# Patient Record
Sex: MALE | Race: WHITE | Employment: FULL TIME | URBAN - METROPOLITAN AREA
[De-identification: names, ages, dates, MRNs, and addresses within clinical notes are randomized per-mention and may not be internally consistent; named-entity substitution may affect disease eponyms.]

---

## 2017-01-22 DIAGNOSIS — I10 ESSENTIAL HYPERTENSION: Primary | ICD-10-CM

## 2017-01-24 RX ORDER — LISINOPRIL 10 MG/1
TABLET ORAL
Qty: 90 TABLET | Refills: 0 | Status: SHIPPED | OUTPATIENT
Start: 2017-01-24 | End: 2017-05-07

## 2017-01-24 NOTE — TELEPHONE ENCOUNTER
LOV 3/28/16. Future Appointments  Date Time Provider Yvette Leonard   1/30/2017 5:00 PM WS 1 [facility]   5/5/2017 3:30 PM Dee Bynum MD [facility]     Refill request for Lisinopril.  Failed HTN protocol because last BMP was

## 2017-02-10 ENCOUNTER — LAB ENCOUNTER (OUTPATIENT)
Dept: LAB | Facility: HOSPITAL | Age: 52
End: 2017-02-10
Attending: INTERNAL MEDICINE
Payer: COMMERCIAL

## 2017-02-10 DIAGNOSIS — E78.5 HYPERLIPIDEMIA, UNSPECIFIED HYPERLIPIDEMIA TYPE: ICD-10-CM

## 2017-02-10 DIAGNOSIS — Z95.4 S/P AORTIC VALVE REPLACEMENT WITH METALLIC VALVE: ICD-10-CM

## 2017-02-10 DIAGNOSIS — Z79.01 LONG TERM (CURRENT) USE OF ANTICOAGULANTS: ICD-10-CM

## 2017-02-10 DIAGNOSIS — I25.10 CORONARY ARTERY DISEASE INVOLVING NATIVE CORONARY ARTERY OF NATIVE HEART WITHOUT ANGINA PECTORIS: ICD-10-CM

## 2017-02-10 DIAGNOSIS — I10 ESSENTIAL HYPERTENSION: ICD-10-CM

## 2017-02-10 LAB
ALBUMIN SERPL-MCNC: 4 G/DL (ref 3.5–4.8)
ALP LIVER SERPL-CCNC: 54 U/L (ref 45–117)
ALT SERPL-CCNC: 75 U/L (ref 17–63)
AST SERPL-CCNC: 32 U/L (ref 15–41)
BASOPHILS # BLD AUTO: 0.06 X10(3) UL (ref 0–0.1)
BASOPHILS NFR BLD AUTO: 0.5 %
BILIRUB SERPL-MCNC: 0.6 MG/DL (ref 0.1–2)
BUN BLD-MCNC: 25 MG/DL (ref 8–20)
CALCIUM BLD-MCNC: 8.6 MG/DL (ref 8.3–10.3)
CHLORIDE: 101 MMOL/L (ref 101–111)
CHOLEST SMN-MCNC: 191 MG/DL (ref ?–200)
CO2: 28 MMOL/L (ref 22–32)
CREAT BLD-MCNC: 1.23 MG/DL (ref 0.7–1.3)
EOSINOPHIL # BLD AUTO: 0.12 X10(3) UL (ref 0–0.3)
EOSINOPHIL NFR BLD AUTO: 1.1 %
ERYTHROCYTE [DISTWIDTH] IN BLOOD BY AUTOMATED COUNT: 14.4 % (ref 11.5–16)
GLUCOSE BLD-MCNC: 103 MG/DL (ref 70–99)
HCT VFR BLD AUTO: 46.2 % (ref 37–53)
HDLC SERPL-MCNC: 58 MG/DL (ref 45–?)
HDLC SERPL: 3.29 {RATIO} (ref ?–4.97)
HGB BLD-MCNC: 15 G/DL (ref 13–17)
IMMATURE GRANULOCYTE COUNT: 0.05 X10(3) UL (ref 0–1)
IMMATURE GRANULOCYTE RATIO %: 0.4 %
LDLC SERPL CALC-MCNC: 110 MG/DL (ref ?–130)
LYMPHOCYTES # BLD AUTO: 1.4 X10(3) UL (ref 0.9–4)
LYMPHOCYTES NFR BLD AUTO: 12.3 %
M PROTEIN MFR SERPL ELPH: 7.7 G/DL (ref 6.1–8.3)
MCH RBC QN AUTO: 27 PG (ref 27–33.2)
MCHC RBC AUTO-ENTMCNC: 32.5 G/DL (ref 31–37)
MCV RBC AUTO: 83.2 FL (ref 80–99)
MONOCYTES # BLD AUTO: 0.94 X10(3) UL (ref 0.1–0.6)
MONOCYTES NFR BLD AUTO: 8.3 %
NEUTROPHIL ABS PRELIM: 8.82 X10 (3) UL (ref 1.3–6.7)
NEUTROPHILS # BLD AUTO: 8.82 X10(3) UL (ref 1.3–6.7)
NEUTROPHILS NFR BLD AUTO: 77.4 %
NONHDLC SERPL-MCNC: 133 MG/DL (ref ?–130)
PLATELET # BLD AUTO: 155 10(3)UL (ref 150–450)
POTASSIUM SERPL-SCNC: 4.4 MMOL/L (ref 3.6–5.1)
RBC # BLD AUTO: 5.55 X10(6)UL (ref 4.3–5.7)
RED CELL DISTRIBUTION WIDTH-SD: 43.5 FL (ref 35.1–46.3)
SODIUM SERPL-SCNC: 136 MMOL/L (ref 136–144)
TRIGLYCERIDES: 114 MG/DL (ref ?–150)
VLDL: 23 MG/DL (ref 5–40)
WBC # BLD AUTO: 11.4 X10(3) UL (ref 4–13)

## 2017-02-10 PROCEDURE — 36415 COLL VENOUS BLD VENIPUNCTURE: CPT

## 2017-02-10 PROCEDURE — 80061 LIPID PANEL: CPT

## 2017-02-10 PROCEDURE — 80053 COMPREHEN METABOLIC PANEL: CPT

## 2017-02-10 PROCEDURE — 85025 COMPLETE CBC W/AUTO DIFF WBC: CPT

## 2017-02-10 NOTE — PROGRESS NOTES
Quick Note:    Please contact the patient tell him that his cholesterol numbers are a little high and he should continue simvastatin but also watch his diet should be low fat. Robin Dumont M.D.   [place] [address] [facility]   2/10/2

## 2017-03-03 ENCOUNTER — OFFICE VISIT (OUTPATIENT)
Dept: FAMILY MEDICINE CLINIC | Facility: CLINIC | Age: 52
End: 2017-03-03

## 2017-03-03 VITALS
BODY MASS INDEX: 29.02 KG/M2 | HEIGHT: 64 IN | DIASTOLIC BLOOD PRESSURE: 80 MMHG | RESPIRATION RATE: 14 BRPM | SYSTOLIC BLOOD PRESSURE: 134 MMHG | HEART RATE: 70 BPM | WEIGHT: 170 LBS | TEMPERATURE: 98 F

## 2017-03-03 DIAGNOSIS — K14.8 TONGUE BITING: Primary | ICD-10-CM

## 2017-03-03 PROCEDURE — 99213 OFFICE O/P EST LOW 20 MIN: CPT | Performed by: FAMILY MEDICINE

## 2017-03-03 NOTE — PROGRESS NOTES
Patient presents with:  Bite: Pt bite tongue last week. Pt would like tongue checked. HPI:   Piero Alex is a 46year old male with PMH CVA, CAD,who presents to the office for tongue injury. Occurred last week while sleeping.   Daughter in law r exam  ASSESSMENT AND PLAN:     Mikie Mei was seen in the office today:  had concerns including Bite. 1. Tongue biting  Unclear cause  Pattern of tongue biting. Possible seizures?   Wife reports shaking movements, but no confusion  Will refer to

## 2017-03-03 NOTE — PATIENT INSTRUCTIONS
[facility] [facility] [address]:  [address] [facility]  [phone]

## 2017-03-29 ENCOUNTER — OFFICE VISIT (OUTPATIENT)
Dept: NEUROLOGY | Facility: CLINIC | Age: 52
End: 2017-03-29

## 2017-03-29 VITALS
BODY MASS INDEX: 30 KG/M2 | RESPIRATION RATE: 18 BRPM | SYSTOLIC BLOOD PRESSURE: 128 MMHG | WEIGHT: 175 LBS | HEART RATE: 72 BPM | DIASTOLIC BLOOD PRESSURE: 64 MMHG

## 2017-03-29 DIAGNOSIS — Z86.73 HISTORY OF STROKE: ICD-10-CM

## 2017-03-29 DIAGNOSIS — K14.8 TONGUE BITING: Primary | ICD-10-CM

## 2017-03-29 PROCEDURE — 99205 OFFICE O/P NEW HI 60 MIN: CPT | Performed by: OTHER

## 2017-03-29 NOTE — PROGRESS NOTES
Neurology H&P    Harry Buck Patient Status:  No patient class for patient encounter    1965 MRN [id number]   Location [address] [place] [address], [address] [place] [address] Drive, [address] [facility] Attending No att. providers found   Hosp Day #  PCP Radha Cantu MD Rfl: 0   Warfarin Sodium 5 MG Oral Tab Take 1-2 tablets daily as directed by Anticoagulation Clinic. Disp: 135 tablet Rfl: 3   Aspirin (ECOTRIN LOW STRENGTH) 81 MG Oral Tab EC Take 81 mg by mouth daily.  Disp:  Rfl:        Problem List:  Patient Active Prob Onset   • Liver cancer [Other] [OTHER] Father    • CAD [Other] [OTHER] Father    • High Blood Pressure Mother    • Stroke Mother      multiple        ROS:  Gen: no unexplained weight loss  Vision: no vision changes, no new blurry or double vision  Head and stroke. He had an episode of recent tongue biting in his sleep. His exam today is on focal. His symptoms clinically sound likely they could be seizures though he states that he can be aroused from his shaking and has never had any AMS.  I will order a 24 ho

## 2017-03-29 NOTE — PATIENT INSTRUCTIONS
Refill policies:    • Allow 2 business days for refills; controlled substances may take longer.   • Contact your pharmacy at least 5 days prior to running out of medication and have them send an electronic request or submit request through the “request re insurance carrier to obtain pre-certification or prior authorization. Unfortunately, [facility] has seen an increase in denial of payment even though the procedure/test has been pre-certified.   You are strongly encouraged to contact your insurance carrier to v

## 2017-03-30 ENCOUNTER — TELEPHONE (OUTPATIENT)
Dept: NEUROLOGY | Facility: CLINIC | Age: 52
End: 2017-03-30

## 2017-03-30 NOTE — TELEPHONE ENCOUNTER
Spoke to pt, MRI Brain ordered by Dr. Kina Medina has been approved by your insurance, authorization # [id number] and is approved through 3.30.17-5.14.17. Please have procedure completed before 5.14.17.  Schedule at your convenience with the central scheduling

## 2017-04-29 ENCOUNTER — HOSPITAL ENCOUNTER (OUTPATIENT)
Dept: MRI IMAGING | Facility: HOSPITAL | Age: 52
Discharge: HOME | End: 2017-04-29
Attending: Other
Payer: COMMERCIAL

## 2017-04-29 DIAGNOSIS — K14.8 TONGUE BITING: ICD-10-CM

## 2017-04-29 DIAGNOSIS — Z86.73 HISTORY OF STROKE: ICD-10-CM

## 2017-04-29 PROCEDURE — A9575 INJ GADOTERATE MEGLUMI 0.1ML: HCPCS | Performed by: OTHER

## 2017-04-29 PROCEDURE — 70553 MRI BRAIN STEM W/O & W/DYE: CPT

## 2017-05-07 DIAGNOSIS — I10 ESSENTIAL HYPERTENSION: Primary | ICD-10-CM

## 2017-05-08 RX ORDER — LISINOPRIL 10 MG/1
TABLET ORAL
Qty: 90 TABLET | Refills: 0 | Status: SHIPPED | OUTPATIENT
Start: 2017-05-08 | End: 2017-05-19

## 2017-05-16 ENCOUNTER — NURSE ONLY (OUTPATIENT)
Dept: NEUROLOGY | Facility: CLINIC | Age: 52
End: 2017-05-16

## 2017-05-16 DIAGNOSIS — K14.8 TONGUE BITING: Primary | ICD-10-CM

## 2017-05-16 PROCEDURE — 95950 AMBULATORY EEG MONITORING: CPT | Performed by: OTHER

## 2017-05-17 ENCOUNTER — TELEPHONE (OUTPATIENT)
Dept: NEUROLOGY | Facility: CLINIC | Age: 52
End: 2017-05-17

## 2017-05-19 ENCOUNTER — OFFICE VISIT (OUTPATIENT)
Dept: NEUROLOGY | Facility: CLINIC | Age: 52
End: 2017-05-19

## 2017-05-19 VITALS
WEIGHT: 172 LBS | RESPIRATION RATE: 16 BRPM | SYSTOLIC BLOOD PRESSURE: 124 MMHG | BODY MASS INDEX: 30 KG/M2 | DIASTOLIC BLOOD PRESSURE: 72 MMHG | HEART RATE: 60 BPM

## 2017-05-19 DIAGNOSIS — K14.8 TONGUE BITING: Primary | ICD-10-CM

## 2017-05-19 PROCEDURE — 99214 OFFICE O/P EST MOD 30 MIN: CPT | Performed by: OTHER

## 2017-05-19 RX ORDER — LEVETIRACETAM 500 MG/1
500 TABLET ORAL 2 TIMES DAILY
Qty: 60 TABLET | Refills: 6 | Status: SHIPPED | OUTPATIENT
Start: 2017-05-19 | End: 2017-08-07

## 2017-05-19 NOTE — PROCEDURES
[facility] - ELECTROENCEPHALOGRAM (EEG) REPORT  Patient Name:  Jessica Williamson   MRN / CSN:  [id number] / [id number]   Date of Birth / Age:  8/2/1965 /  46year old   Encounter Date:  5/16/17         METHODS:  Twenty-two electrodes were applied according to the HV:  performed. IPS: Not performed.     IMPRESSION:  This EEG is an abnormal study recorded in the awake and asleep states showing intermittent sharp waves over the R frontal areas (F8, Fp1, F4) and intermixed sharply contoured

## 2017-05-19 NOTE — PROGRESS NOTES
Neurology H&P    Bryson Andrews Ajithjeromy Patient Status:  No patient class for patient encounter    1965 MRN [id number]   Location [address] [place] [address], [address] [place] [address], [address] [place] [address] Attending No att. providers found   Hosp Day #  PCP Darryl Lee MD that he feels well today. Current Medications:    Current Outpatient Prescriptions:  Atorvastatin Calcium 20 MG Oral Tab Take 1 tablet (20 mg total) by mouth daily.  Disp: 90 tablet Rfl: 3   Metoprolol Succinate  MG Oral Tablet 24 Hr Take 1 tablet BYPASS; W/ STENT< TISSUE VALVE  2011    Comment [facility]       SocHx:    Smoking Status: Former Smoker                   Packs/Day: 0.00  Years:         Smokeless Status: Never Used                        Comment: quit 2000    Alcoh and LEs     SENSORY EXAMINATION:  UE: intact to light touch, pinprick  LE: intact to light touch, pinprick    COORDINATION:  No dysmetria, mild intention tremors L>R; normal ENRIQUE    REFLEXES: 2+ at biceps, 2+ triceps, 2+ at patella, 2+ at the ankles    GAIT

## 2017-08-06 DIAGNOSIS — I10 ESSENTIAL HYPERTENSION: ICD-10-CM

## 2017-08-07 DIAGNOSIS — K14.8 TONGUE BITING: ICD-10-CM

## 2017-08-07 RX ORDER — LEVETIRACETAM 500 MG/1
500 TABLET ORAL 2 TIMES DAILY
Qty: 180 TABLET | Refills: 0 | Status: SHIPPED | OUTPATIENT
Start: 2017-08-07 | End: 2017-08-22

## 2017-08-07 NOTE — TELEPHONE ENCOUNTER
Request for 90 days supply.         Medication: Keppra    Date of last refill: 05/19/17  [place]    Last office visit: 05/19/17  Due back to clinic per last office note:  3 months  Date next office visit scheduled:  0

## 2017-08-08 RX ORDER — LISINOPRIL 10 MG/1
TABLET ORAL
Qty: 90 TABLET | Refills: 0 | Status: SHIPPED | OUTPATIENT
Start: 2017-08-08 | End: 2017-08-22

## 2017-08-22 ENCOUNTER — OFFICE VISIT (OUTPATIENT)
Dept: NEUROLOGY | Facility: CLINIC | Age: 52
End: 2017-08-22

## 2017-08-22 VITALS
SYSTOLIC BLOOD PRESSURE: 100 MMHG | DIASTOLIC BLOOD PRESSURE: 60 MMHG | HEART RATE: 64 BPM | WEIGHT: 161 LBS | BODY MASS INDEX: 28 KG/M2

## 2017-08-22 DIAGNOSIS — Z86.73 HISTORY OF STROKE: Primary | ICD-10-CM

## 2017-08-22 DIAGNOSIS — K14.8 TONGUE BITING: ICD-10-CM

## 2017-08-22 DIAGNOSIS — R56.9 SEIZURE (HCC): ICD-10-CM

## 2017-08-22 PROCEDURE — 99213 OFFICE O/P EST LOW 20 MIN: CPT | Performed by: OTHER

## 2017-08-22 RX ORDER — LEVETIRACETAM 500 MG/1
500 TABLET ORAL 2 TIMES DAILY
Qty: 180 TABLET | Refills: 2 | Status: SHIPPED | OUTPATIENT
Start: 2017-08-22 | End: 2018-02-15

## 2017-08-22 NOTE — PATIENT INSTRUCTIONS
Refill policies:    • Allow 2-3 business days for refills; controlled substances may take longer.   • Contact your pharmacy at least 5 days prior to running out of medication and have them send an electronic request or submit request through the [facility] have a procedure or additional testing performed. [facility]) will contact your insurance carrier to obtain pre-certification or prior authorization.     Unfortunately, [facility] has seen an increase in denial of payment even though the p

## 2017-08-22 NOTE — PROGRESS NOTES
Neurology H&P    Emily Margaret Buck Patient Status:  No patient class for patient encounter    1965 MRN [id number]   Location [address] [place] [address], [address] [place] [address], [address] [place] [address] Attending No att. providers found   Hosp Day #  PCP Thomas De Dios MD Oral Tab TAKE 1 TABLET BY MOUTH DAILY. Disp: 90 tablet Rfl: 0   levETIRAcetam 500 MG Oral Tab Take 1 tablet (500 mg total) by mouth 2 (two) times daily.  Disp: 180 tablet Rfl: 0   Warfarin Sodium 5 MG Oral Tab TAKE 1 & 1/2 TABLETS (7.5MG) MONDAY/WEDNESDAY/F korina, [facility]  11/13/12: OTHER SURGICAL HISTORY      Comment: laproscopic rt shoulder repair   2011: REPLACEMENT PROSTHETIC AORTIC VALVE W/ CARDIOP*      Comment: [facility]  No date: TONSILLECTOMY      Comment: age muscles; palate elevation intact, no dysarthria, no tongue fasciculations or atrophy, strong shoulder shrug.     MOTOR EXAMINATION: normal tone, no fasciculations, normal strength throughout in UEs and LEs     SENSORY EXAMINATION:  UE: intact to light touch

## 2017-11-07 DIAGNOSIS — I10 ESSENTIAL HYPERTENSION: ICD-10-CM

## 2017-11-08 RX ORDER — LISINOPRIL 10 MG/1
TABLET ORAL
Qty: 90 TABLET | Refills: 0 | Status: SHIPPED | OUTPATIENT
Start: 2017-11-08 | End: 2018-02-04

## 2018-02-04 DIAGNOSIS — I10 ESSENTIAL HYPERTENSION: ICD-10-CM

## 2018-02-05 RX ORDER — LISINOPRIL 10 MG/1
TABLET ORAL
Qty: 90 TABLET | Refills: 0 | Status: SHIPPED | OUTPATIENT
Start: 2018-02-05 | End: 2018-05-06

## 2018-02-05 NOTE — TELEPHONE ENCOUNTER
Refill request was received by Pharmacy. One refill given. Pt needs Appt with Dr Altaf Lopez. LOV 03/03/2017. Please call Pt and assist with scheduling. Routed to [facility].

## 2018-02-07 NOTE — TELEPHONE ENCOUNTER
Called patient and let him know that prescription was refilled but needs an appointment in order to get future refills, patient stated he will check his schedule and contact the office to schedule

## 2018-02-12 ENCOUNTER — OFFICE VISIT (OUTPATIENT)
Dept: FAMILY MEDICINE CLINIC | Facility: CLINIC | Age: 53
End: 2018-02-12

## 2018-02-12 VITALS
TEMPERATURE: 98 F | HEIGHT: 63.4 IN | WEIGHT: 153.38 LBS | SYSTOLIC BLOOD PRESSURE: 90 MMHG | BODY MASS INDEX: 26.84 KG/M2 | DIASTOLIC BLOOD PRESSURE: 60 MMHG | HEART RATE: 80 BPM | RESPIRATION RATE: 14 BRPM

## 2018-02-12 DIAGNOSIS — R56.9 SEIZURE (HCC): ICD-10-CM

## 2018-02-12 DIAGNOSIS — I10 ESSENTIAL HYPERTENSION, BENIGN: ICD-10-CM

## 2018-02-12 DIAGNOSIS — Z79.01 LONG TERM (CURRENT) USE OF ANTICOAGULANTS: ICD-10-CM

## 2018-02-12 DIAGNOSIS — E78.5 HYPERLIPIDEMIA, UNSPECIFIED HYPERLIPIDEMIA TYPE: ICD-10-CM

## 2018-02-12 DIAGNOSIS — Z00.00 ANNUAL PHYSICAL EXAM: Primary | ICD-10-CM

## 2018-02-12 DIAGNOSIS — Z12.11 COLON CANCER SCREENING: ICD-10-CM

## 2018-02-12 DIAGNOSIS — I25.10 CORONARY ARTERY DISEASE INVOLVING NATIVE CORONARY ARTERY OF NATIVE HEART WITHOUT ANGINA PECTORIS: ICD-10-CM

## 2018-02-12 DIAGNOSIS — Z95.4 S/P AORTIC VALVE REPLACEMENT WITH METALLIC VALVE: ICD-10-CM

## 2018-02-12 PROBLEM — K14.8 TONGUE BITING: Status: RESOLVED | Noted: 2017-03-29 | Resolved: 2018-02-12

## 2018-02-12 PROCEDURE — 99396 PREV VISIT EST AGE 40-64: CPT | Performed by: FAMILY MEDICINE

## 2018-02-12 NOTE — PROGRESS NOTES
Patient presents with:  Physical     HPI:   Rubén Hawkins is a 45year old male with h/o CAD and CVA, seizure who presents for a complete physical exam. Feeling well today. Last colonoscopy:  Never in the past.    Last PSA:  No family history.    Imm tablet Rfl: 3   Atorvastatin Calcium 20 MG Oral Tab Take 1 tablet (20 mg total) by mouth daily. Disp: 90 tablet Rfl: 3   Aspirin (ECOTRIN LOW STRENGTH) 81 MG Oral Tab EC Take 81 mg by mouth daily.  Disp:  Rfl:       Past Medical History:   Diagnosis Date 153 lb 6.4 oz   BMI 26.83 kg/m²   Body mass index is 26.83 kg/m². General appearance: alert, appears stated age and cooperative  Eyes: conjunctivae/corneas clear. PERRL, EOM's intact.    Ears: normal TM's and external ear canals both ears  Nose: Nares n

## 2018-02-15 ENCOUNTER — OFFICE VISIT (OUTPATIENT)
Dept: NEUROLOGY | Facility: CLINIC | Age: 53
End: 2018-02-15

## 2018-02-15 ENCOUNTER — LAB ENCOUNTER (OUTPATIENT)
Dept: LAB | Facility: HOSPITAL | Age: 53
End: 2018-02-15
Attending: FAMILY MEDICINE
Payer: COMMERCIAL

## 2018-02-15 VITALS
DIASTOLIC BLOOD PRESSURE: 52 MMHG | HEART RATE: 64 BPM | WEIGHT: 155 LBS | RESPIRATION RATE: 16 BRPM | SYSTOLIC BLOOD PRESSURE: 100 MMHG | BODY MASS INDEX: 27 KG/M2

## 2018-02-15 DIAGNOSIS — I63.9 CEREBRAL INFARCTION, UNSPECIFIED MECHANISM (HCC): ICD-10-CM

## 2018-02-15 DIAGNOSIS — E78.5 HYPERLIPIDEMIA: ICD-10-CM

## 2018-02-15 DIAGNOSIS — I25.10 CAD (CORONARY ARTERY DISEASE), NATIVE CORONARY ARTERY: ICD-10-CM

## 2018-02-15 DIAGNOSIS — Z12.11 COLON CANCER SCREENING: ICD-10-CM

## 2018-02-15 DIAGNOSIS — I45.2 RBBB (RIGHT BUNDLE BRANCH BLOCK WITH LEFT ANTERIOR FASCICULAR BLOCK): ICD-10-CM

## 2018-02-15 DIAGNOSIS — Z95.4 S/P AORTIC VALVE REPLACEMENT WITH METALLIC VALVE: ICD-10-CM

## 2018-02-15 DIAGNOSIS — R56.9 SEIZURE (HCC): Primary | ICD-10-CM

## 2018-02-15 DIAGNOSIS — I10 HYPERTENSION: ICD-10-CM

## 2018-02-15 LAB
ALBUMIN SERPL-MCNC: 4 G/DL (ref 3.5–4.8)
ALP LIVER SERPL-CCNC: 44 U/L (ref 45–117)
ALT SERPL-CCNC: 38 U/L (ref 17–63)
AST SERPL-CCNC: 23 U/L (ref 15–41)
BILIRUB DIRECT SERPL-MCNC: 0.1 MG/DL (ref 0.1–0.5)
BILIRUB SERPL-MCNC: 0.6 MG/DL (ref 0.1–2)
CHOLEST SMN-MCNC: 138 MG/DL (ref ?–200)
HDLC SERPL-MCNC: 59 MG/DL (ref 45–?)
HDLC SERPL: 2.34 {RATIO} (ref ?–4.97)
LDLC SERPL CALC-MCNC: 68 MG/DL (ref ?–130)
M PROTEIN MFR SERPL ELPH: 7.2 G/DL (ref 6.1–8.3)
NONHDLC SERPL-MCNC: 79 MG/DL (ref ?–130)
TRIGL SERPL-MCNC: 54 MG/DL (ref ?–150)
VLDLC SERPL CALC-MCNC: 11 MG/DL (ref 5–40)

## 2018-02-15 PROCEDURE — 36415 COLL VENOUS BLD VENIPUNCTURE: CPT

## 2018-02-15 PROCEDURE — 99213 OFFICE O/P EST LOW 20 MIN: CPT | Performed by: OTHER

## 2018-02-15 PROCEDURE — 80076 HEPATIC FUNCTION PANEL: CPT

## 2018-02-15 PROCEDURE — 80061 LIPID PANEL: CPT

## 2018-02-15 RX ORDER — LEVETIRACETAM 500 MG/1
500 TABLET ORAL 2 TIMES DAILY
Qty: 180 TABLET | Refills: 3 | Status: SHIPPED | OUTPATIENT
Start: 2018-02-15 | End: 2019-04-30

## 2018-02-15 NOTE — PROGRESS NOTES
Neurology H&P    Robert Arjun Cansecojeromy Patient Status:  No patient class for patient encounter    1965 MRN [id number]   Location [address] [place] [address], [address] [place] [address], [address] [place] [address] Attending No att. providers found   Hosp Day #  PCP Jose Lee MD TAKE 1 TABLET BY MOUTH DAILY. Disp: 90 tablet Rfl: 0   Metoprolol Succinate  MG Oral Tablet 24 Hr Take 1 tablet (100 mg total) by mouth once daily.  Disp: 90 tablet Rfl: 3   levETIRAcetam 500 MG Oral Tab Take 1 tablet (500 mg total) by mouth 2 (two) t shoulder repair   2011: REPLACEMENT PROSTHETIC AORTIC VALVE W/ CARDIOP*      Comment: [facility]  No date: TONSILLECTOMY      Comment: age 9    SocHx:  Smoking status: Former Smoker fasciculations or atrophy, strong shoulder shrug.     MOTOR EXAMINATION: normal tone, no fasciculations, normal strength throughout in UEs and LEs     SENSORY EXAMINATION:  UE: intact to light touch, pinprick  LE: intact to light touch, pinprick    [place]

## 2018-02-15 NOTE — PATIENT INSTRUCTIONS
Refill policies:    • Allow 2-3 business days for refills; controlled substances may take longer.   • Contact your pharmacy at least 5 days prior to running out of medication and have them send an electronic request or submit request through the [facility] recommended that you have a procedure or additional testing performed. [facility]) will contact your insurance carrier to obtain pre-certification or prior authorization.     Unfortunately, [facility] has seen an increase in denial of paym

## 2018-05-06 DIAGNOSIS — I10 ESSENTIAL HYPERTENSION: ICD-10-CM

## 2018-05-07 RX ORDER — LISINOPRIL 10 MG/1
TABLET ORAL
Qty: 90 TABLET | Refills: 0 | Status: SHIPPED | OUTPATIENT
Start: 2018-05-07 | End: 2018-08-04

## 2018-08-04 DIAGNOSIS — I10 ESSENTIAL HYPERTENSION: ICD-10-CM

## 2018-08-06 RX ORDER — LISINOPRIL 10 MG/1
TABLET ORAL
Qty: 90 TABLET | Refills: 2 | Status: SHIPPED | OUTPATIENT
Start: 2018-08-06 | End: 2019-05-01

## 2018-08-06 NOTE — TELEPHONE ENCOUNTER
Refill request from [facility] for Lisinopril 10mg. LOV 2/12/18 with [address] for annual physical.  This request did not pass protocol because of the date of his last CMP 2/10/17. A CMP and Lipids were ordered by Nick PÉREZ for patient to have these labs drawn. He has no upcoming appointments at this office. Will you refill Lisinopril?   Route to [address]

## 2019-02-27 ENCOUNTER — TELEPHONE (OUTPATIENT)
Dept: FAMILY MEDICINE CLINIC | Facility: CLINIC | Age: 54
End: 2019-02-27

## 2019-02-27 NOTE — TELEPHONE ENCOUNTER
LOV 2/12/2018 with Dr Erinn Bills for physical no mention of Scoliosis in problem list called patient called patient and LMOM to call back about this needs to be seen so MD can establish that this is a problem

## 2019-02-27 NOTE — TELEPHONE ENCOUNTER
Patient was seen this morning at a clinic that he was sent to to get a physical for his new job. The doctor at that clinic diagnosed him with Scoliosis.  Patient was told by his employer to get a note from his PCP stating that this diagnosis would not be a

## 2019-02-28 ENCOUNTER — OFFICE VISIT (OUTPATIENT)
Dept: FAMILY MEDICINE CLINIC | Facility: CLINIC | Age: 54
End: 2019-02-28
Payer: COMMERCIAL

## 2019-02-28 VITALS
HEIGHT: 62 IN | RESPIRATION RATE: 16 BRPM | BODY MASS INDEX: 28.71 KG/M2 | OXYGEN SATURATION: 97 % | SYSTOLIC BLOOD PRESSURE: 110 MMHG | HEART RATE: 56 BPM | TEMPERATURE: 98 F | WEIGHT: 156 LBS | DIASTOLIC BLOOD PRESSURE: 66 MMHG

## 2019-02-28 DIAGNOSIS — Z76.89 RETURN TO WORK EXAM: Primary | ICD-10-CM

## 2019-02-28 PROCEDURE — 99213 OFFICE O/P EST LOW 20 MIN: CPT | Performed by: PHYSICIAN ASSISTANT

## 2019-03-01 NOTE — PROGRESS NOTES
CC: Scoliosis evaluation? HISTORY OF PRESENT ILLNESS  Caity Etienne is a 52year old male who presents for evaluation of possible scoliosis. 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  He was required to have a work physica history      aortic valve replacement #27 [place] [employer], [facility]   • Other surgical history  11/13/12    laproscopic rt shoulder repair    • Replacement prosthetic aortic valve w/ cardiopulmonary bypass; w/ stent< tissue valve  2011    Lo

## 2019-04-30 DIAGNOSIS — R56.9 SEIZURE (HCC): ICD-10-CM

## 2019-05-01 DIAGNOSIS — I10 ESSENTIAL HYPERTENSION: ICD-10-CM

## 2019-05-01 RX ORDER — LEVETIRACETAM 500 MG/1
500 TABLET ORAL 2 TIMES DAILY
Qty: 60 TABLET | Refills: 0 | Status: SHIPPED | OUTPATIENT
Start: 2019-05-01 | End: 2019-05-31

## 2019-05-01 NOTE — TELEPHONE ENCOUNTER
Sent the patient a [email or web address] message stating that the patient would need to make an appointment for further medications.      Medication: LEVETIRACETAM 500 MG Oral Tab    Date of last refill: 02/15/18 (#180/3)  [place]

## 2019-05-02 ENCOUNTER — TELEPHONE (OUTPATIENT)
Dept: FAMILY MEDICINE CLINIC | Facility: CLINIC | Age: 54
End: 2019-05-02

## 2019-05-02 DIAGNOSIS — Z13.0 SCREENING FOR BLOOD DISEASE: Primary | ICD-10-CM

## 2019-05-02 DIAGNOSIS — Z13.228 SCREENING FOR METABOLIC DISORDER: ICD-10-CM

## 2019-05-02 DIAGNOSIS — Z13.220 SCREENING FOR LIPID DISORDERS: ICD-10-CM

## 2019-05-02 DIAGNOSIS — Z13.29 SCREENING FOR THYROID DISORDER: ICD-10-CM

## 2019-05-02 DIAGNOSIS — Z12.5 SCREENING FOR PROSTATE CANCER: ICD-10-CM

## 2019-05-02 RX ORDER — LISINOPRIL 10 MG/1
TABLET ORAL
Qty: 90 TABLET | Refills: 0 | Status: SHIPPED | OUTPATIENT
Start: 2019-05-02 | End: 2019-07-25

## 2019-05-02 NOTE — TELEPHONE ENCOUNTER
Please enter lab orders for the patient's upcoming physical appointment. Physical scheduled:    Your appointments     Date & Time Appointment Department [facility])    May 14, 2019  5:30 PM CDT Physical - Established Patient with Shamika Collier

## 2019-05-02 NOTE — TELEPHONE ENCOUNTER
Please enter lab orders for the patient's upcoming physical appointment. Physical scheduled:    Your appointments     Date & Time Appointment Department [facility])    May 13, 2019  6:00 PM CDT Physical - Established Patient with RENATA Dave

## 2019-05-13 ENCOUNTER — LAB ENCOUNTER (OUTPATIENT)
Dept: LAB | Facility: HOSPITAL | Age: 54
End: 2019-05-13
Attending: NURSE PRACTITIONER
Payer: COMMERCIAL

## 2019-05-13 DIAGNOSIS — Z12.5 SCREENING FOR PROSTATE CANCER: ICD-10-CM

## 2019-05-13 DIAGNOSIS — Z13.0 SCREENING FOR BLOOD DISEASE: ICD-10-CM

## 2019-05-13 DIAGNOSIS — Z13.228 SCREENING FOR METABOLIC DISORDER: ICD-10-CM

## 2019-05-13 DIAGNOSIS — Z13.29 SCREENING FOR THYROID DISORDER: ICD-10-CM

## 2019-05-13 DIAGNOSIS — Z13.220 SCREENING FOR LIPID DISORDERS: ICD-10-CM

## 2019-05-13 PROCEDURE — 85025 COMPLETE CBC W/AUTO DIFF WBC: CPT

## 2019-05-13 PROCEDURE — 36415 COLL VENOUS BLD VENIPUNCTURE: CPT

## 2019-05-13 PROCEDURE — 80061 LIPID PANEL: CPT

## 2019-05-13 PROCEDURE — 80053 COMPREHEN METABOLIC PANEL: CPT

## 2019-05-13 PROCEDURE — 84443 ASSAY THYROID STIM HORMONE: CPT

## 2019-05-14 ENCOUNTER — OFFICE VISIT (OUTPATIENT)
Dept: FAMILY MEDICINE CLINIC | Facility: CLINIC | Age: 54
End: 2019-05-14
Payer: COMMERCIAL

## 2019-05-14 VITALS
BODY MASS INDEX: 27.65 KG/M2 | TEMPERATURE: 98 F | WEIGHT: 158 LBS | HEART RATE: 68 BPM | RESPIRATION RATE: 16 BRPM | SYSTOLIC BLOOD PRESSURE: 124 MMHG | DIASTOLIC BLOOD PRESSURE: 72 MMHG | HEIGHT: 63.5 IN

## 2019-05-14 DIAGNOSIS — I10 ESSENTIAL HYPERTENSION: ICD-10-CM

## 2019-05-14 DIAGNOSIS — Z00.00 ANNUAL PHYSICAL EXAM: Primary | ICD-10-CM

## 2019-05-14 DIAGNOSIS — Z95.4 S/P AORTIC VALVE REPLACEMENT WITH METALLIC VALVE: ICD-10-CM

## 2019-05-14 DIAGNOSIS — R56.9 SEIZURE (HCC): ICD-10-CM

## 2019-05-14 DIAGNOSIS — Z12.11 COLON CANCER SCREENING: ICD-10-CM

## 2019-05-14 DIAGNOSIS — Z79.01 LONG TERM (CURRENT) USE OF ANTICOAGULANTS: ICD-10-CM

## 2019-05-14 DIAGNOSIS — E78.5 HYPERLIPIDEMIA, UNSPECIFIED HYPERLIPIDEMIA TYPE: ICD-10-CM

## 2019-05-14 DIAGNOSIS — Z86.73 HISTORY OF STROKE: ICD-10-CM

## 2019-05-14 PROCEDURE — 99396 PREV VISIT EST AGE 40-64: CPT | Performed by: PHYSICIAN ASSISTANT

## 2019-05-15 NOTE — PROGRESS NOTES
DATE OF EXAM  5/14/2019    CHIEF COMPLAINT/REASON FOR VISIT  Annual exam.    HISTORY OF PRESENT ILLNESS  Cathlakeisha Credit presents today for routine annual physical examination.      - History of coronary artery disease, bicuspid aortic valve s/p metallic means     Bicuspid aortic valve     S/P aortic valve replacement with metallic valve     RBBB (right bundle branch block with left anterior fascicular block)     History of stroke     Seizure [facility])     H/O coronary artery bypass surgery      Past Surgical wheezing. HEART: No chest pain, irregular heartbeats, swelling in extremities. MUSCULOSKELETAL: No muscle pain, no joint pain, no joint swelling. ABDOMEN: No abdominal pain. GI: No nausea, vomiting, diarrhea, or constipation.  No change in bowel movem normal.       IMPRESSION/REPORT/PLAN    1. Routine physical examination:  Patient is doing well overall. Discussed age appropriate health topics including: regular exercise, balanced diet, sunscreen, monitoring moles, testicular self-exams, safety.  Health

## 2019-05-31 ENCOUNTER — TELEPHONE (OUTPATIENT)
Dept: NEUROLOGY | Facility: CLINIC | Age: 54
End: 2019-05-31

## 2019-05-31 DIAGNOSIS — R56.9 SEIZURE (HCC): ICD-10-CM

## 2019-05-31 RX ORDER — LEVETIRACETAM 500 MG/1
TABLET ORAL
Qty: 60 TABLET | Refills: 0 | Status: SHIPPED | OUTPATIENT
Start: 2019-05-31 | End: 2019-06-30

## 2019-05-31 NOTE — TELEPHONE ENCOUNTER
LMTCB and schedule an appt for medications refills. The patient has not been seen in over a year. 2nd attempt.      Medication: LEVETIRACETAM 500 MG Oral Tab    Date of last refill: 05/01/19 (#60/0)  [place]    Last o

## 2019-05-31 NOTE — TELEPHONE ENCOUNTER
Called patient and left detailed message on verified voicemail (Siddhartha Taylor per [employer]). Informed patient that per note below, this will be the last refill unless an appointment has been made. Patient LOV was 2/2018.

## 2019-06-30 DIAGNOSIS — R56.9 SEIZURE (HCC): ICD-10-CM

## 2019-07-01 NOTE — TELEPHONE ENCOUNTER
Medication: LEVETIRACETAM 500 MG Oral Tab    Date of last refill: 05/31/19 (#60/0)  [place]    Last office visit: 02/15/18  Due back to clinic per last office note:  Around 02/15/19  Date next office visit scheduled:

## 2019-07-02 RX ORDER — LEVETIRACETAM 500 MG/1
TABLET ORAL
Qty: 60 TABLET | Refills: 0 | Status: SHIPPED | OUTPATIENT
Start: 2019-07-02 | End: 2019-07-05

## 2019-07-05 ENCOUNTER — OFFICE VISIT (OUTPATIENT)
Dept: NEUROLOGY | Facility: CLINIC | Age: 54
End: 2019-07-05
Payer: COMMERCIAL

## 2019-07-05 VITALS
SYSTOLIC BLOOD PRESSURE: 120 MMHG | HEART RATE: 68 BPM | RESPIRATION RATE: 16 BRPM | BODY MASS INDEX: 28 KG/M2 | DIASTOLIC BLOOD PRESSURE: 70 MMHG | WEIGHT: 158 LBS

## 2019-07-05 DIAGNOSIS — R56.9 SEIZURE (HCC): ICD-10-CM

## 2019-07-05 PROCEDURE — 99213 OFFICE O/P EST LOW 20 MIN: CPT | Performed by: OTHER

## 2019-07-05 RX ORDER — LEVETIRACETAM 500 MG/1
500 TABLET ORAL 2 TIMES DAILY
Qty: 180 TABLET | Refills: 1 | Status: SHIPPED | OUTPATIENT
Start: 2019-07-05 | End: 2019-12-13

## 2019-07-05 NOTE — PROGRESS NOTES
Neurology H&P    [address] [place] [address] Patient Status:  No patient class for patient encounter    1965 MRN [id number]   Location ED [facility], [address], [address] [facility] Attending No att. providers found   Hosp Day #  PCP Emily Bueno MD LISINOPRIL 10 MG Oral Tab TAKE 1 TABLET BY MOUTH DAILY. Disp: 90 tablet Rfl: 0   METOPROLOL SUCCINATE  MG Oral Tablet 24 Hr TAKE 1 TABLET (100 MG TOTAL) BY MOUTH ONCE DAILY.  Disp: 90 tablet Rfl: 1   Warfarin Sodium 5 MG Oral Tab Take 1 OR 1 and 1/2 age 9       SocHx:  Social History    Tobacco Use      Smoking status: Former Smoker      Smokeless tobacco: Never Used      Tobacco comment: quit 2000    Alcohol use:  Yes      Alcohol/week: 8.4 oz      Types: 14 Standard drinks or equivalent per week pinprick  LE: intact to light touch, pinprick    COORDINATION:  No dysmetria, mild intention tremors L>R; normal ENRIQUE    REFLEXES: 2+ at biceps, 2+ triceps, 2+ at patella, 2+ at the ankles    GAIT: normal stance, normal toe gait and heel gait, tandem well.

## 2019-07-25 DIAGNOSIS — I10 ESSENTIAL HYPERTENSION: ICD-10-CM

## 2019-07-25 RX ORDER — LISINOPRIL 10 MG/1
TABLET ORAL
Qty: 90 TABLET | Refills: 0 | Status: SHIPPED | OUTPATIENT
Start: 2019-07-25 | End: 2019-10-04

## 2019-07-25 NOTE — TELEPHONE ENCOUNTER
Requested Prescriptions     Pending Prescriptions Disp Refills   • LISINOPRIL 10 MG Oral Tab [Pharmacy Med Name: LISINOPRIL 10 MG TABLET] 90 tablet 0     Sig: TAKE 1 TABLET BY MOUTH DAILY.      LOV 5/14/2019   Last CMP was 05/13/2019- Creatinine 1.00    Garrett Kirk

## 2019-10-25 ENCOUNTER — TELEPHONE (OUTPATIENT)
Dept: FAMILY MEDICINE CLINIC | Facility: CLINIC | Age: 54
End: 2019-10-25

## 2019-10-25 NOTE — TELEPHONE ENCOUNTER
Spoke with Starr Aceves telling him Jose Watkins would like him to speak with his cardiologist to make sure they think it's a good idea before moving forward because heart health history.  He also said the process should start through the person receiving the kidney

## 2019-10-25 NOTE — TELEPHONE ENCOUNTER
Patient would like to donate his kidney to someone he met through his [place] meetings, would like to know the process

## 2019-10-25 NOTE — TELEPHONE ENCOUNTER
I think the process would start through the person who is getting the kidney  Certainly with [facility] heart health in the past, I would make sure he has a conversation with his cardiologist to make sure they agree its a good idea before moving forward.

## 2019-11-15 ENCOUNTER — TELEPHONE (OUTPATIENT)
Dept: NEUROLOGY | Facility: CLINIC | Age: 54
End: 2019-11-15

## 2019-12-13 DIAGNOSIS — R56.9 SEIZURE (HCC): ICD-10-CM

## 2019-12-13 RX ORDER — LEVETIRACETAM 500 MG/1
TABLET ORAL
Qty: 60 TABLET | Refills: 0 | Status: SHIPPED | OUTPATIENT
Start: 2019-12-13 | End: 2019-12-30

## 2019-12-30 ENCOUNTER — OFFICE VISIT (OUTPATIENT)
Dept: NEUROLOGY | Facility: CLINIC | Age: 54
End: 2019-12-30
Payer: COMMERCIAL

## 2019-12-30 VITALS
HEART RATE: 70 BPM | WEIGHT: 170 LBS | DIASTOLIC BLOOD PRESSURE: 58 MMHG | SYSTOLIC BLOOD PRESSURE: 106 MMHG | BODY MASS INDEX: 29 KG/M2 | RESPIRATION RATE: 16 BRPM

## 2019-12-30 DIAGNOSIS — R56.9 SEIZURE (HCC): Primary | ICD-10-CM

## 2019-12-30 PROCEDURE — 99213 OFFICE O/P EST LOW 20 MIN: CPT | Performed by: OTHER

## 2019-12-30 RX ORDER — LEVETIRACETAM 500 MG/1
500 TABLET ORAL 2 TIMES DAILY
Qty: 180 TABLET | Refills: 1 | Status: SHIPPED | OUTPATIENT
Start: 2019-12-30 | End: 2020-06-22

## 2019-12-30 NOTE — PROGRESS NOTES
Neurology H&P    [address] [place] [address] Patient Status:  No patient class for patient encounter    1965 MRN [id number]   Location [address] [facility], [address] [place] [address] Drive, [address] [facility] Attending No att. providers found   Hosp Day #  PCP Krish Miller MD MG Oral Tablet 24 Hr TAKE 1 TABLET BY MOUTH EVERY DAY 90 tablet 2   • LEVETIRACETAM 500 MG Oral Tab TAKE 1 TABLET BY MOUTH TWICE A DAY 60 tablet 0   • lisinopril 10 MG Oral Tab Take 1 tablet (10 mg total) by mouth once daily.  90 tablet 2   • ATORVASTATIN 2 TONSILLECTOMY      age 9       SocHx:  Social History    Tobacco Use      Smoking status: Former Smoker      Smokeless tobacco: Never Used      Tobacco comment: quit 2000    Alcohol use:  Yes      Alcohol/week: 14.0 standard drinks      Types: 14 Standard d EXAMINATION:  UE: intact to light touch, pinprick  LE: intact to light touch, pinprick    COORDINATION:  No dysmetria, mild intention tremors L>R; normal ENRIQUE    REFLEXES: 2+ at biceps, 2+ triceps, 2+ at patella, 2+ at the ankles    GAIT: normal stance, nor

## 2020-06-22 DIAGNOSIS — R56.9 SEIZURE (HCC): ICD-10-CM

## 2020-06-22 RX ORDER — LEVETIRACETAM 500 MG/1
TABLET ORAL
Qty: 180 TABLET | Refills: 0 | Status: SHIPPED | OUTPATIENT
Start: 2020-06-22 | End: 2020-09-15

## 2020-06-22 NOTE — TELEPHONE ENCOUNTER
Medication: LEVETIRACETAM 500 MG Oral Tab    Date of last refill: 12/30/19 (#180/1)  [place]    Last office visit: 12/30/2019  Due back to clinic per last office note:  Around 06/30/2020  Date next office visit sched

## 2020-07-06 ENCOUNTER — OFFICE VISIT (OUTPATIENT)
Dept: NEUROLOGY | Facility: CLINIC | Age: 55
End: 2020-07-06
Payer: COMMERCIAL

## 2020-07-06 VITALS
DIASTOLIC BLOOD PRESSURE: 66 MMHG | WEIGHT: 166 LBS | HEART RATE: 68 BPM | RESPIRATION RATE: 16 BRPM | SYSTOLIC BLOOD PRESSURE: 108 MMHG | BODY MASS INDEX: 28 KG/M2

## 2020-07-06 DIAGNOSIS — R56.9 SEIZURE (HCC): Primary | ICD-10-CM

## 2020-07-06 PROCEDURE — 99213 OFFICE O/P EST LOW 20 MIN: CPT | Performed by: OTHER

## 2020-07-06 NOTE — PROGRESS NOTES
Neurology H&P    [address] [place] [address] Patient Status:  No patient class for patient encounter    1965 MRN [id number]   Location [address] [facility], [address] [place] [address] Drive, [address] [place] [address] Attending No att. providers found   Hosp Day #  PCP Jeanette Everett MD Medications   Medication Sig Dispense Refill   • LEVETIRACETAM 500 MG Oral Tab TAKE 1 TABLET BY MOUTH TWICE A  tablet 0   • WARFARIN SODIUM 5 MG Oral Tab TAKE ONE AND ONE-HALF TABLETS 2-DAYS WEEKLY AND TAKE ONE TABLET 5-DAYS WEEKLY OR AS DIRECTED 12 CARDIOPULMONARY BYPASS; W/ STENT< TISSUE VALVE  2011    [facility]   • TONSILLECTOMY      age 9       SocHx:  Social History    Tobacco Use      Smoking status: Former Smoker      Smokeless tobacco: Never Used      Tobacco comment: qu normal strength throughout in UEs and LEs     SENSORY EXAMINATION:  UE: intact to light touch, pinprick  LE: intact to light touch, pinprick    COORDINATION:  No dysmetria, mild intention tremors L>R; normal ENRIQUE    REFLEXES: 2+ at biceps, 2+ triceps, 2+ at

## 2020-09-14 DIAGNOSIS — R56.9 SEIZURE (HCC): ICD-10-CM

## 2020-09-15 RX ORDER — LEVETIRACETAM 500 MG/1
TABLET ORAL
Qty: 180 TABLET | Refills: 1 | Status: SHIPPED | OUTPATIENT
Start: 2020-09-15 | End: 2021-03-15

## 2020-09-15 NOTE — TELEPHONE ENCOUNTER
Medication: LEVETIRACETAM 500 MG Oral Tab    Date of last refill: 06/22/2020 (#180/0)  [place]    Last office visit: 7/6/2020  Due back to clinic per last office note:  Around 03/06/2021  Date next office visit sched

## 2020-10-21 ENCOUNTER — LAB ENCOUNTER (OUTPATIENT)
Dept: LAB | Facility: HOSPITAL | Age: 55
End: 2020-10-21
Attending: INTERNAL MEDICINE
Payer: COMMERCIAL

## 2020-10-21 DIAGNOSIS — Z95.4 S/P AORTIC VALVE REPLACEMENT WITH METALLIC VALVE: ICD-10-CM

## 2020-10-21 DIAGNOSIS — I10 ESSENTIAL HYPERTENSION: ICD-10-CM

## 2020-10-21 DIAGNOSIS — I25.10 CORONARY ARTERIOSCLEROSIS: ICD-10-CM

## 2020-10-21 DIAGNOSIS — Z95.2 S/P AVR (AORTIC VALVE REPLACEMENT): ICD-10-CM

## 2020-10-21 DIAGNOSIS — Z95.4 STATUS POST AORTIC VALVE REPLACEMENT WITH METALLIC VALVE: ICD-10-CM

## 2020-10-21 PROCEDURE — 80061 LIPID PANEL: CPT

## 2020-10-21 PROCEDURE — 36415 COLL VENOUS BLD VENIPUNCTURE: CPT

## 2020-10-21 PROCEDURE — 80053 COMPREHEN METABOLIC PANEL: CPT

## 2020-11-25 ENCOUNTER — LAB ENCOUNTER (OUTPATIENT)
Dept: LAB | Facility: HOSPITAL | Age: 55
End: 2020-11-25
Attending: INTERNAL MEDICINE
Payer: COMMERCIAL

## 2020-11-25 DIAGNOSIS — Z79.01 LONG TERM (CURRENT) USE OF ANTICOAGULANTS: ICD-10-CM

## 2020-11-25 DIAGNOSIS — Z95.4 S/P AORTIC VALVE REPLACEMENT WITH METALLIC VALVE: ICD-10-CM

## 2020-11-25 DIAGNOSIS — Z51.81 ENCOUNTER FOR THERAPEUTIC DRUG MONITORING: ICD-10-CM

## 2020-11-25 PROCEDURE — 85610 PROTHROMBIN TIME: CPT

## 2020-11-25 PROCEDURE — 36415 COLL VENOUS BLD VENIPUNCTURE: CPT

## 2021-03-13 DIAGNOSIS — R56.9 SEIZURE (HCC): ICD-10-CM

## 2021-03-15 RX ORDER — LEVETIRACETAM 500 MG/1
TABLET ORAL
Qty: 180 TABLET | Refills: 1 | Status: SHIPPED | OUTPATIENT
Start: 2021-03-15 | End: 2021-09-13

## 2021-03-15 NOTE — TELEPHONE ENCOUNTER
Medication: LEVETIRACETAM 500 MG Oral Tab    Date of last refill: 9/15/2020 (#180/1)  [place]    Last office visit: 7/6/2020  Due back to clinic per last office note:  8 months  Date next office visit scheduled:    F

## 2021-09-11 DIAGNOSIS — R56.9 SEIZURE (HCC): ICD-10-CM

## 2021-09-13 RX ORDER — LEVETIRACETAM 500 MG/1
TABLET ORAL
Qty: 60 TABLET | Refills: 0 | Status: SHIPPED | OUTPATIENT
Start: 2021-09-13 | End: 2021-10-12

## 2021-09-13 NOTE — TELEPHONE ENCOUNTER
Patient needs follow up appointment for further refills. Pended 30 day supply & messaged patient via "[employer]" to schedule follow up appointment.      Medication: LEVETIRACETAM 500 MG Oral Tab    Date of last refill: 3/15/21 (#60/0)  [place]

## 2021-10-12 DIAGNOSIS — R56.9 SEIZURE (HCC): ICD-10-CM

## 2021-10-12 RX ORDER — LEVETIRACETAM 500 MG/1
TABLET ORAL
Qty: 60 TABLET | Refills: 0 | Status: SHIPPED | OUTPATIENT
Start: 2021-10-12 | End: 2021-12-06

## 2021-10-12 NOTE — TELEPHONE ENCOUNTER
Called patient & left detailed message advising appointment is needed for further refills.      Medication: LEVETIRACETAM 500 MG Oral Tab    Date of last refill: 9/13/21 (#60/0)  [place]    Last office visit: 7/6/20

## 2021-10-27 RX ORDER — LEVETIRACETAM 500 MG/1
500 TABLET ORAL 2 TIMES DAILY
Qty: 180 TABLET | Refills: 0 | OUTPATIENT
Start: 2021-10-27

## 2021-10-27 NOTE — TELEPHONE ENCOUNTER
Request for 90 day supply of Levetiracetam denied- needs cornelio't for further refills per Dr Hellen Sparks note.

## 2021-10-29 RX ORDER — LEVETIRACETAM 500 MG/1
500 TABLET ORAL 2 TIMES DAILY
Qty: 180 TABLET | Refills: 0 | OUTPATIENT
Start: 2021-10-29

## 2021-10-29 NOTE — TELEPHONE ENCOUNTER
Received request again for Levetiracetam 90 day supply. Message left on patient VM to schedule cornelio't.

## 2021-11-05 DIAGNOSIS — R56.9 SEIZURE (HCC): ICD-10-CM

## 2021-11-05 RX ORDER — LEVETIRACETAM 500 MG/1
500 TABLET ORAL 2 TIMES DAILY
Qty: 180 TABLET | Refills: 0 | OUTPATIENT
Start: 2021-11-05

## 2021-12-04 DIAGNOSIS — R56.9 SEIZURE (HCC): ICD-10-CM

## 2021-12-06 RX ORDER — LEVETIRACETAM 500 MG/1
TABLET ORAL
Qty: 60 TABLET | Refills: 0 | Status: SHIPPED | OUTPATIENT
Start: 2021-12-06 | End: 2022-01-04

## 2021-12-06 NOTE — TELEPHONE ENCOUNTER
Medication: LEVETIRACETAM 500 MG Oral Tab     Date of last refill: 10/12/2021 (#60/0)  [place]     Last office visit: 07/06/2020  Due back to clinic per last office note:  Around 03/06/2021  Date next office visit sc

## 2021-12-26 NOTE — PROGRESS NOTES
States his wife noticing that he shakes, bites his tongue in sleep, and then wakes up. Usually occurs every 3 months. Last occurances include 02/24 and 03/13. I personally performed the service described in the documentation  recorded by the scribe in my presence, and it accurately and completely records my words and action.

## 2022-01-03 DIAGNOSIS — R56.9 SEIZURE (HCC): ICD-10-CM

## 2022-01-04 RX ORDER — LEVETIRACETAM 500 MG/1
TABLET ORAL
Qty: 60 TABLET | Refills: 0 | Status: SHIPPED | OUTPATIENT
Start: 2022-01-04 | End: 2022-01-14

## 2022-01-04 NOTE — TELEPHONE ENCOUNTER
Medication: LEVETIRACETAM 500 MG Oral Tab     Date of last refill: 12/06/2021 (#60/0)  [place]     Last office visit: 07/06/2020  Due back to clinic per last office note:  Around 03/06/2021  Date next office visit sc

## 2022-01-14 ENCOUNTER — OFFICE VISIT (OUTPATIENT)
Dept: NEUROLOGY | Facility: CLINIC | Age: 57
End: 2022-01-14
Payer: COMMERCIAL

## 2022-01-14 VITALS
BODY MASS INDEX: 28 KG/M2 | DIASTOLIC BLOOD PRESSURE: 66 MMHG | SYSTOLIC BLOOD PRESSURE: 108 MMHG | RESPIRATION RATE: 16 BRPM | HEART RATE: 70 BPM | WEIGHT: 163 LBS

## 2022-01-14 DIAGNOSIS — R56.9 SEIZURE (HCC): Primary | ICD-10-CM

## 2022-01-14 DIAGNOSIS — Z86.73 HISTORY OF STROKE: ICD-10-CM

## 2022-01-14 PROCEDURE — 3074F SYST BP LT 130 MM HG: CPT | Performed by: OTHER

## 2022-01-14 PROCEDURE — 3078F DIAST BP <80 MM HG: CPT | Performed by: OTHER

## 2022-01-14 PROCEDURE — 99213 OFFICE O/P EST LOW 20 MIN: CPT | Performed by: OTHER

## 2022-01-14 RX ORDER — LEVETIRACETAM 500 MG/1
500 TABLET ORAL 2 TIMES DAILY
Qty: 180 TABLET | Refills: 2 | Status: SHIPPED | OUTPATIENT
Start: 2022-01-14

## 2022-01-14 NOTE — PROGRESS NOTES
Neurology H&P    [address] [place] [address] Patient Status:  No patient class for patient encounter    1965 MRN [id number]   Location ED [facility], [address], [address] [facility] Attending No att. providers found   Hosp Day #  PCP Eli Maradiaga MD Outpatient Medications   Medication Sig Dispense Refill   • LEVETIRACETAM 500 MG Oral Tab TAKE 1 TABLET BY MOUTH TWICE A DAY 60 tablet 0   • metoprolol succinate 100 MG Oral Tablet 24 Hr Take 1 tablet (100 mg total) by mouth daily.  90 tablet 3   • lisinopr BYPASS; W/ STENT< TISSUE VALVE  2011    [facility]   • TONSILLECTOMY      age 9       SocHx:  Social History    Tobacco Use      Smoking status: Former Smoker      Smokeless tobacco: Never Used      Tobacco comment: quit 2000    Sander throughout in UEs and LEs     SENSORY EXAMINATION:  UE: intact to light touch, pinprick  LE: intact to light touch, pinprick    COORDINATION:  No dysmetria, mild intention tremors L>R    REFLEXES: 2+ at biceps, 2+ triceps, 2+ at patella     GAIT: normal st

## 2022-01-14 NOTE — PATIENT INSTRUCTIONS
After your visit at the [facility] office today,  please direct any follow up questions or medication needs to the staff in our [place] office so that your concerns may be promptly addressed.   We are available through [employer] or at the numbers below: picked up in office. • Please allow the office 2-3 business days to fill the prescription. • Patient must present photo ID at time of . PLEASE NOTE: PRESCRIPTIONS MUST BE PICKED UP PRIOR TO 3:00PM MONDAY-FRIDAY    Scheduling Tests:     If your ph submitting forms to office staff. • Form completion may require an additional fee. • A signed Release of Information (STAN) must be on file before forms may be submitted. When dropping off forms, please ask the  for this paper.    • Failure

## 2022-03-27 NOTE — TELEPHONE ENCOUNTER
Medication: LEVETIRACETAM 500 MG    Date of last refill: 7/5/19 (#180/1)  [place]     Last office visit: 7/5/2019  Due back to clinic per last office note:  6 months  Date next office visit scheduled:    Future Appointme No

## 2022-10-07 RX ORDER — ATORVASTATIN CALCIUM 20 MG/1
TABLET, FILM COATED ORAL
Qty: 90 TABLET | Refills: 2 | OUTPATIENT
Start: 2022-10-07

## 2022-10-14 DIAGNOSIS — R56.9 SEIZURE (HCC): ICD-10-CM

## 2022-10-14 RX ORDER — LEVETIRACETAM 500 MG/1
TABLET ORAL
Qty: 180 TABLET | Refills: 2 | OUTPATIENT
Start: 2022-10-14

## 2022-10-17 DIAGNOSIS — R56.9 SEIZURE (HCC): ICD-10-CM

## 2022-10-17 RX ORDER — LEVETIRACETAM 500 MG/1
500 TABLET ORAL 2 TIMES DAILY
Qty: 60 TABLET | Refills: 0 | Status: SHIPPED | OUTPATIENT
Start: 2022-10-17

## 2022-10-21 RX ORDER — LEVETIRACETAM 500 MG/1
TABLET ORAL
Qty: 180 TABLET | Refills: 2 | OUTPATIENT
Start: 2022-10-21

## 2022-11-09 DIAGNOSIS — R56.9 SEIZURE (HCC): ICD-10-CM

## 2022-11-09 RX ORDER — LEVETIRACETAM 500 MG/1
TABLET ORAL
Qty: 60 TABLET | Refills: 0 | Status: SHIPPED | OUTPATIENT
Start: 2022-11-09

## 2022-12-07 DIAGNOSIS — R56.9 SEIZURE (HCC): ICD-10-CM

## 2022-12-07 RX ORDER — LEVETIRACETAM 500 MG/1
TABLET ORAL
Qty: 60 TABLET | Refills: 0 | Status: SHIPPED | OUTPATIENT
Start: 2022-12-07

## 2022-12-31 DIAGNOSIS — R56.9 SEIZURE (HCC): ICD-10-CM

## 2023-01-03 RX ORDER — LEVETIRACETAM 500 MG/1
TABLET ORAL
Qty: 60 TABLET | Refills: 0 | Status: SHIPPED | OUTPATIENT
Start: 2023-01-03

## 2023-01-26 DIAGNOSIS — R56.9 SEIZURE (HCC): ICD-10-CM

## 2023-01-26 RX ORDER — LEVETIRACETAM 500 MG/1
TABLET ORAL
Qty: 60 TABLET | Refills: 0 | Status: SHIPPED | OUTPATIENT
Start: 2023-01-26

## 2023-02-28 DIAGNOSIS — R56.9 SEIZURE (HCC): ICD-10-CM

## 2023-02-28 RX ORDER — LEVETIRACETAM 500 MG/1
TABLET ORAL
Qty: 60 TABLET | Refills: 0 | Status: SHIPPED | OUTPATIENT
Start: 2023-02-28

## 2023-02-28 NOTE — TELEPHONE ENCOUNTER
SAKINATCB on VM (ok per HIPAA consent) to schedule cornelio't with Dr Radha Ferrara for further refills.

## 2023-03-11 DIAGNOSIS — Z86.73 HISTORY OF STROKE: ICD-10-CM

## 2023-03-11 DIAGNOSIS — R56.9 SEIZURE (HCC): ICD-10-CM

## 2023-03-13 RX ORDER — ATORVASTATIN CALCIUM 20 MG/1
TABLET, FILM COATED ORAL
Qty: 30 TABLET | Refills: 0 | OUTPATIENT
Start: 2023-03-13

## 2023-03-13 RX ORDER — LEVETIRACETAM 500 MG/1
TABLET ORAL
Qty: 60 TABLET | Refills: 0 | OUTPATIENT
Start: 2023-03-13

## 2023-03-30 DIAGNOSIS — Z86.73 HISTORY OF STROKE: ICD-10-CM

## 2023-03-30 DIAGNOSIS — R56.9 SEIZURE (HCC): ICD-10-CM

## 2023-03-31 RX ORDER — ATORVASTATIN CALCIUM 20 MG/1
TABLET, FILM COATED ORAL
Qty: 30 TABLET | Refills: 2 | Status: SHIPPED | OUTPATIENT
Start: 2023-03-31

## 2023-03-31 RX ORDER — LEVETIRACETAM 500 MG/1
TABLET ORAL
Qty: 60 TABLET | Refills: 2 | Status: SHIPPED | OUTPATIENT
Start: 2023-03-31

## 2023-05-14 DIAGNOSIS — R56.9 SEIZURE (HCC): ICD-10-CM

## 2023-05-14 DIAGNOSIS — Z86.73 HISTORY OF STROKE: ICD-10-CM

## 2023-05-15 RX ORDER — LEVETIRACETAM 500 MG/1
TABLET ORAL
Qty: 180 TABLET | Refills: 0 | Status: SHIPPED | OUTPATIENT
Start: 2023-05-15

## 2023-05-15 RX ORDER — ATORVASTATIN CALCIUM 20 MG/1
TABLET, FILM COATED ORAL
Qty: 90 TABLET | Refills: 0 | Status: SHIPPED | OUTPATIENT
Start: 2023-05-15

## 2023-08-13 DIAGNOSIS — Z86.73 HISTORY OF STROKE: ICD-10-CM

## 2023-08-13 DIAGNOSIS — R56.9 SEIZURE (HCC): ICD-10-CM

## 2023-08-14 RX ORDER — LEVETIRACETAM 500 MG/1
500 TABLET ORAL 2 TIMES DAILY
Qty: 180 TABLET | Refills: 0 | Status: SHIPPED | OUTPATIENT
Start: 2023-08-14

## 2023-08-14 RX ORDER — ATORVASTATIN CALCIUM 20 MG/1
20 TABLET, FILM COATED ORAL DAILY
Qty: 90 TABLET | Refills: 0 | Status: SHIPPED | OUTPATIENT
Start: 2023-08-14

## 2023-08-14 NOTE — TELEPHONE ENCOUNTER
Medication: LEVETIRACETAM 500 MG Oral Tab    Date of last refill: 05/15/23 (180/0)  [place] 09/[place]    Last office visit: 01/14/22  Due back to clinic per last office note:  10 months  Date next office visit scheduled:  No future appointments. Last OV note recommendation:     Plan:  1. possible seizure - night time events only  - Continue Keppra 500mg PO BID        2.  History of stroke  - Continue warfarin   - Continue to follow with cardiology     RTC in 10 months      Medication: ATORVASTATIN 20 MG Oral Tab    Date of last refill: 05/15/23 (90/0)  [place] 95[place]42/87

## 2023-12-10 DIAGNOSIS — Z86.73 HISTORY OF STROKE: ICD-10-CM

## 2023-12-10 DIAGNOSIS — R56.9 SEIZURE (HCC): ICD-10-CM

## 2023-12-11 NOTE — TELEPHONE ENCOUNTER
Medication: ATORVASTATIN 20 MG Oral Tab    Date of last refill: 08/14/23 (90/0)  [place]      Medication:LEVETIRACETAM 500 MG Oral Tab  Date of last refill: 8/14/23 (180/0)  [place] 14/91/99    Last office visit: 01/14/22  Due back to clinic per last office note:  10 months  Date next office visit scheduled:  No future appointments. Last OV note recommendation:     Plan:  1. possible seizure - night time events only  - Continue Keppra 500mg PO BID        2.  History of stroke  - Continue warfarin   - Continue to follow with cardiology     RTC in 10 months

## 2023-12-14 RX ORDER — LEVETIRACETAM 500 MG/1
500 TABLET ORAL 2 TIMES DAILY
Qty: 180 TABLET | Refills: 0 | Status: SHIPPED | OUTPATIENT
Start: 2023-12-14

## 2023-12-14 RX ORDER — ATORVASTATIN CALCIUM 20 MG/1
20 TABLET, FILM COATED ORAL DAILY
Qty: 90 TABLET | Refills: 0 | Status: SHIPPED | OUTPATIENT
Start: 2023-12-14

## 2024-02-11 DIAGNOSIS — Z86.73 HISTORY OF STROKE: ICD-10-CM

## 2024-02-11 DIAGNOSIS — R56.9 SEIZURE (HCC): ICD-10-CM

## 2024-02-12 RX ORDER — LEVETIRACETAM 500 MG/1
500 TABLET ORAL 2 TIMES DAILY
OUTPATIENT
Start: 2024-02-12

## 2024-02-12 RX ORDER — ATORVASTATIN CALCIUM 20 MG/1
20 TABLET, FILM COATED ORAL DAILY
OUTPATIENT
Start: 2024-02-12

## 2024-02-12 NOTE — TELEPHONE ENCOUNTER
Sent the patient a [facility] message to make an appt for further medication refills. Patient has not been seen. Patient canceled last appointment and has not reschedule.       Medication: LEVETIRACETAM 500 MG Oral Tab + ATORVASTATIN 20 MG Oral Tab     Date of last refill: 12/14/2023 (#180/0) + 12/14/2023 (#90/0)  [place]     Last office visit: 01/14/2022  Due back to clinic per last office note:  RTC 10 months  Date next office visit scheduled:    No future appointments.        Last OV note recommendation:    Assessment:  This is a 55 y/o male with h/o HTN, HL, CAD and stroke and seizures     Plan:  1. possible seizure - night time events only  - Continue Keppra 500mg PO BID        2. History of stroke  - Continue warfarin   - Continue to follow with cardiology     RTC in 10 months     Paramjit Mendes,   Neurology

## 2024-02-12 NOTE — TELEPHONE ENCOUNTER
No further refills. Pt has not been seen for 2 years. He has not responded to several attempts to get him to make and appointment. I am not certain that he follows at this clonic any longer. I have reached out to the patient personally and there was no answer

## 2024-02-23 DIAGNOSIS — Z86.73 HISTORY OF STROKE: ICD-10-CM

## 2024-02-23 DIAGNOSIS — R56.9 SEIZURE (HCC): ICD-10-CM

## 2024-02-23 NOTE — TELEPHONE ENCOUNTER
Per TE 02/11/2024, refused medication patient needs to be seen in office.         Medication: LEVETIRACETAM 500 MG Oral Tab + ATORVASTATIN 20 MG Oral Tab     Date of last refill: 12/14/2023 (#180/0) + 12/14/2023 (#90/0)  [place]     Last office visit: 01/14/2022  Due back to clinic per last office note:  RTC 10 months  Date next office visit scheduled:    No future appointments.        Last OV note recommendation:    Assessment:  This is a 57 y/o male with h/o HTN, HL, CAD and stroke and seizures     Plan:  1. possible seizure - night time events only  - Continue Keppra 500mg PO BID        2. History of stroke  - Continue warfarin   - Continue to follow with cardiology     RTC in 10 months     Paramjit Mendes DO  Neurology

## 2024-02-26 RX ORDER — LEVETIRACETAM 500 MG/1
500 TABLET ORAL 2 TIMES DAILY
Qty: 180 TABLET | Refills: 0 | OUTPATIENT
Start: 2024-02-26

## 2024-02-26 RX ORDER — ATORVASTATIN CALCIUM 20 MG/1
20 TABLET, FILM COATED ORAL DAILY
Qty: 90 TABLET | Refills: 0 | OUTPATIENT
Start: 2024-02-26

## 2024-03-16 DIAGNOSIS — R56.9 SEIZURE (HCC): ICD-10-CM

## 2024-03-18 RX ORDER — LEVETIRACETAM 500 MG/1
500 TABLET ORAL 2 TIMES DAILY
Qty: 180 TABLET | Refills: 0 | OUTPATIENT
Start: 2024-03-18

## 2024-03-18 NOTE — TELEPHONE ENCOUNTER
Refused. Patient has not been seen in over 2 years, patient will need an appt for further medication refills.       Medication: LEVETIRACETAM 500 MG Oral Tab      Date of last refill: 12/14/2023 (#180/0)  [place]     Last office visit: 01/14/2022  Due back to clinic per last office note:  not stated  Date next office visit scheduled:    No future appointments.        Last OV note recommendation:    Assessment:  This is a 55 y/o male with h/o HTN, HL, CAD and stroke and seizures     Plan:  1. possible seizure - night time events only  - Continue Keppra 500mg PO BID        2. History of stroke  - Continue warfarin   - Continue to follow with cardiology     RTC in 10 months     Paramjit Mendes DO  Neurology

## 2024-03-24 DIAGNOSIS — Z86.73 HISTORY OF STROKE: ICD-10-CM

## 2024-03-27 NOTE — TELEPHONE ENCOUNTER
Refused. Patient has not been seen since 2022.       Medication: ATORVASTATIN 20 MG Oral Tab      Date of last refill: 12/14/2023 (#90/0)  [place]     Last office visit: 01/14/2022  Due back to clinic per last office note:  RTC 10 months  Date next office visit scheduled:    No future appointments.        Last OV note recommendation:    Assessment:  This is a 55 y/o male with h/o HTN, HL, CAD and stroke and seizures     Plan:  1. possible seizure - night time events only  - Continue Keppra 500mg PO BID        2. History of stroke  - Continue warfarin   - Continue to follow with cardiology     RTC in 10 months     Paramjit Mendes,   Neurology

## 2024-03-29 RX ORDER — ATORVASTATIN CALCIUM 20 MG/1
20 TABLET, FILM COATED ORAL DAILY
Qty: 90 TABLET | Refills: 0 | OUTPATIENT
Start: 2024-03-29

## 2024-05-12 DIAGNOSIS — R56.9 SEIZURE (HCC): ICD-10-CM

## 2024-05-12 DIAGNOSIS — Z86.73 HISTORY OF STROKE: ICD-10-CM

## 2024-05-13 DIAGNOSIS — R56.9 SEIZURE (HCC): ICD-10-CM

## 2024-05-13 RX ORDER — ATORVASTATIN CALCIUM 20 MG/1
20 TABLET, FILM COATED ORAL DAILY
Qty: 90 TABLET | Refills: 0 | OUTPATIENT
Start: 2024-05-13

## 2024-05-13 RX ORDER — LEVETIRACETAM 500 MG/1
500 TABLET ORAL 2 TIMES DAILY
Qty: 180 TABLET | Refills: 0 | OUTPATIENT
Start: 2024-05-13

## 2024-05-13 NOTE — TELEPHONE ENCOUNTER
Pt has scheduled f/u appt. Requesting refill levETIRAcetam 500 MG Oral Tab; [id number]. Please advise, Pt's best cb #[phone]. Endorsed to [facility].

## 2024-05-13 NOTE — TELEPHONE ENCOUNTER
Attempted to reach patient phone just rang. Patient has been advised previous he will need to be seen to continue to receive refills.     Medication: Atorvastatin     Date of last refill: 12/14/2023 (#90/0)  [place]20[place]    Medication: Levetiracetam 500 mg     Date of last refill: 12/14/2023 (#180/0)  [place] 12[place]14/2023     Last office visit: 01/14/2022  Due back to clinic per last office note:  10 months  Date next office visit scheduled:    No future appointments.        Last OV note recommendation:    Assessment:  This is a 57 y/o male with h/o HTN, HL, CAD and stroke and seizures     Plan:  1. possible seizure - night time events only  - Continue Keppra 500mg PO BID        2. History of stroke  - Continue warfarin   - Continue to follow with cardiology     RTC in 10 months

## 2024-05-13 NOTE — TELEPHONE ENCOUNTER
Pt has not been seen since 10/14/2022.  Made follow up appt for 8/2024.    Pended two week supply of medication.    Left detailed message for pt to call office to be added to schedule within the next two weeks for follow up.     When pt returns call, please transfer to [facility] RN for pt to be scheduled.

## 2024-05-14 RX ORDER — LEVETIRACETAM 500 MG/1
500 TABLET ORAL 2 TIMES DAILY
Qty: 28 TABLET | Refills: 0 | Status: SHIPPED | OUTPATIENT
Start: 2024-05-14 | End: 2024-05-17

## 2024-05-14 NOTE — TELEPHONE ENCOUNTER
Pt returned call and RN was unable to hear pt.     RN attempted to call pt back and call went VM.  TCB.    Also sent [employer] message.  CAMELIAG RN will take call to get pt on schedule earlier

## 2024-05-17 ENCOUNTER — OFFICE VISIT (OUTPATIENT)
Dept: NEUROLOGY | Facility: CLINIC | Age: 59
End: 2024-05-17

## 2024-05-17 VITALS
BODY MASS INDEX: 30 KG/M2 | RESPIRATION RATE: 15 BRPM | WEIGHT: 175.06 LBS | DIASTOLIC BLOOD PRESSURE: 56 MMHG | HEART RATE: 69 BPM | SYSTOLIC BLOOD PRESSURE: 109 MMHG

## 2024-05-17 DIAGNOSIS — R56.9 SEIZURE (HCC): ICD-10-CM

## 2024-05-17 PROCEDURE — 99213 OFFICE O/P EST LOW 20 MIN: CPT | Performed by: OTHER

## 2024-05-17 PROCEDURE — 3078F DIAST BP <80 MM HG: CPT | Performed by: OTHER

## 2024-05-17 PROCEDURE — 3074F SYST BP LT 130 MM HG: CPT | Performed by: OTHER

## 2024-05-17 RX ORDER — ATORVASTATIN CALCIUM 40 MG/1
TABLET, FILM COATED ORAL
COMMUNITY
Start: 2024-03-26 | End: 2024-05-17 | Stop reason: ALTCHOICE

## 2024-05-17 RX ORDER — LEVETIRACETAM 500 MG/1
500 TABLET ORAL 2 TIMES DAILY
Qty: 180 TABLET | Refills: 3 | Status: SHIPPED | OUTPATIENT
Start: 2024-05-17

## 2024-05-17 RX ORDER — METOPROLOL SUCCINATE 50 MG/1
TABLET, EXTENDED RELEASE ORAL
COMMUNITY
Start: 2024-02-23

## 2024-05-17 NOTE — PROGRESS NOTES
Neurology H&P    Jose Christina Patient Status:  No patient class for patient encounter    1965 MRN [id number]   Location [facility] Attending No att. providers found   [facility] #  PCP Weston Veras MD     Subjective:  Initial Clinic visit 3/29/17  Jose Christina is a(n) 58 year old male with a PMH significant for HL, HTN, Stroke, CAD and on Warfarin. He comes to neurology clinic for evaluation of possible seizure. He state that per his wife for over 1 year he has been having shaking spells at night. He is not awake for these events. They occur about every 3 months. He is uncertain of how long these last but possibly for a few minutes. He also tends to bite his tongue during these events. He denies any confusion following these events. He states that his wife can wake him up during these events. He has not had any incontinence. He denies any family or personal history of seizure. He denies any h/o CNS infection or TBI. He has never had any febrile seizures as a child. He has never been on any AED. He states that he feels well following these events and has no lethargy, weakness, numbness, tingling or headaches following these events. He thinks that these events have only occurred about 4 times. He denies any episodes of LOC or AMS or lost periods of time.  He is driving. 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 He denies any vision changes or slurred speech. He denies any SOB or difficulty swallowing. He has not had any new stroke like symptoms.       Interim History:  Pt was last seen in clinic on 2022.  He never returned to clinic for follow-up of his epilepsy after this.  He comes back to clinic today for follow-up of epilepsy.  He is still taking Keppra 500 mg twice daily.  He reports he has not had any seizures since seeing me last.    Current Medications:  Current Outpatient Medications   Medication Sig Dispense Refill    metoprolol  succinate ER 50 MG Oral Tablet 24 Hr       levETIRAcetam 500 MG Oral Tab Take 1 tablet (500 mg total) by mouth 2 (two) times daily. 28 tablet 0    atorvastatin 20 MG Oral Tab Take 1 tablet (20 mg total) by mouth daily. 90 tablet 0    lisinopril 10 MG Oral Tab Take 1 tablet (10 mg total) by mouth daily. 90 tablet 3    aspirin 81 MG Oral Tab EC Take 1 tablet (81 mg total) by mouth daily.      WARFARIN 5 MG Oral Tab TAKE ONE AND ONE-HALF TABLETS 1-DAY WEEKLY AND TAKE ONE TABLET 6-DAYS WEEKLY OR AS DIRECTED 120 tablet 4       Problem List:  Patient Active Problem List   Diagnosis    Current use of anticoagulants    Cerebral infarction (HCC)    Hyperlipidemia    Hypertension    Valvular heart disease    Ventricular tachycardia (HCC)    CAD (coronary artery disease), native coronary artery    Heart valve replaced by other means    Bicuspid aortic valve (HCC)    S/P aortic valve replacement with metallic valve    RBBB (right bundle branch block with left anterior fascicular block)    History of stroke    Seizure (HCC)    H/O coronary artery bypass surgery       PMHx:  Past Medical History:    Bicuspid aortic valve (HCC)    CAD (coronary artery disease), native coronary artery    Coronary heart disease    Current use of anticoagulants    CVA (cerebral infarction)    May 2011 [facility]     Heart valve replaced by other means    Hyperlipidemia    Hypertension    Valvular heart disease    Ventricular tachycardia (HCC)       PSHx:  Past Surgical History:   Procedure Laterality Date    Cabg  9/26/11    x4 LIMA to LAD, SVG to RCA, diagonal obtuse marginal     Other surgical history      aortic valve replacement #27 [facility], [facility]    Other surgical history  11/13/12    laproscopic rt shoulder repair     Replacement prosthetic aortic valve w/ cardiopulmonary bypass; w/ stent< tissue valve  2011    [facility]    Tonsillectomy      age 3       SocHx:  Social History      Socioeconomic History    Marital status:     Number of children: 2   Occupational History     Comment: [employer]   Tobacco Use    Smoking status: Former    Smokeless tobacco: Never    Tobacco comments:     quit 2000   Vaping Use    Vaping status: Never Used   Substance and Sexual Activity    Alcohol use: Yes     Alcohol/week: 14.0 standard drinks of alcohol     Types: 14 Standard drinks or equivalent per week     Comment: 2 drinks a night    Drug use: No   Other Topics Concern    Caffeine Concern Yes     Comment: 1 cup coffee daily    Exercise Yes     Comment: gym every other day     Seat Belt Yes       Family History:  Family History   Problem Relation Age of Onset    Other (Liver cancer) Father     Other (CAD) Father     High Blood Pressure Mother     Stroke Mother         multiple        ROS:  Gen: no unexplained weight loss  Vision: no vision changes, no new blurry or double vision  Head and Neck: no eye or ear discharge  Pulmonary: no SOB, no new cough  CV: no chest pain, no new lower extremity swelling  GI: no constipation or abdominal pain  : denies frequent urination or difficulty urinating   Heme: no new bruising, no unexplained fevers or chills  Endocrine: no unexplained weight loss or gain, no new fatigue  Musculoskeletal: denies back pain, denies joint pain  Psych: denies depression   Skin: denies any new masses, rashes, lumps or bruising    Objective/Physical Exam:    Vital Signs:  Blood pressure 109/56, pulse 69, resp. rate 15, weight 175 lb 0.7 oz (79.4 kg).    Gen: Awake and in no apparent distress  HEENT: moist mucus membranes  Neck: Supple  Cardiovascular: Regular rate and rhythm, mechanical sound  Pulm: CTAB  GI: non-tender, normal bowel sounds  Skin: normal, dry  Extremities: No clubbing or cyanosis      Neurologic:   MENTAL STATUS: alert, ox3, normal attention, language and fund of knowledge.      CRANIAL NERVES II to XII: PERRLA, no ptosis or diplopia, EOM intact, facial  sensation intact, strong eye closure and lower facial muscles & jaw muscles; Mild L NLF flattening, no dysarthria, , strong shoulder shrug.    MOTOR EXAMINATION: normal tone, no fasciculations, normal strength throughout in UEs and LEs     SENSORY EXAMINATION:  UE: intact to light touch, pinprick  LE: intact to light touch, pinprick    COORDINATION:  No dysmetria, mild intention tremors L>R    REFLEXES: 2+ at biceps, 2+ triceps, 2+ at patella     GAIT: normal stance, normal toe gait and heel gait, tandem well.    Romberg's: negative    Labs:       Imaging:  No recent imaging    Assessment:  This is a 57 y/o male with h/o HTN, HL, CAD and stroke and seizures    Plan:  1. possible seizure - night time events only  - Continue Keppra 500mg PO BID       2. History of stroke  - Continue warfarin   - Continue to follow with cardiology    RTC in 10 months    Paramjit Mendes,   Neurology

## 2024-05-17 NOTE — PATIENT INSTRUCTIONS
After your visit at the [facility] today,  please direct any follow up questions or medication needs to the staff in our [place] office so that your concerns may be promptly addressed.  We are available through [employer] or at the numbers below:    The phone number is:   [phone] option #1    The fax number is:  [phone]    Your pharmacy should also send any requests electronically to the [place] office.  Refill policies:    Allow 2-3 business days for refills; controlled substances may take longer.  Contact your pharmacy at least 5 days prior to running out of medication and have them send an electronic request or submit request through the “request refill” option in your [employer] account.  Refills are not addressed on weekends; covering physicians do not authorize routine medications on weekends.  No narcotics or controlled substances are refilled after noon on Fridays or by on call physicians.  By law, narcotics must be electronically prescribed.  A 30 day supply with no refills is the maximum allowed.  If your prescription is due for a refill, you may be due for a follow up appointment.  To best provide you care, patients receiving routine medications need to be seen at least once a year.  Patients receiving narcotic/controlled substance medications need to be seen at least once every 3 months.  In the event that your preferred pharmacy does not have the requested medication in stock (e.g. Backordered), it is your responsibility to find another pharmacy that has the requested medication available.  We will gladly send a new prescription to that pharmacy at your request.    Scheduling Tests:    If your physician has ordered radiology tests such as MRI or CT scans, please contact Central Scheduling at [phone] right away to schedule the test.  Once scheduled, the [facility] Centralized Referral Team will work with your insurance carrier to obtain pre-certification or prior authorization.   Depending on your insurance carrier, approval may take 3-10 days.  It is highly recommended patients assure they have received an authorization before having a test performed.  If test is done without insurance authorization, patient may be responsible for the entire amount billed.      Precertification and Prior Authorizations:  If your physician has recommended that you have a procedure or additional testing performed the [facility] Centralized Referral Team will contact your insurance carrier to obtain pre-certification or prior authorization.    You are strongly encouraged to contact your insurance carrier to verify that your procedure/test has been approved and is a COVERED benefit.  Although the [facility] Centralized Referral Team does its due diligence, the insurance carrier gives the disclaimer that \"Although the procedure is authorized, this does not guarantee payment.\"    Ultimately the patient is responsible for payment.   Thank you for your understanding in this matter.  Paperwork Completion:  If you require FMLA or disability paperwork for your recovery, please make sure to either drop it off or have it faxed to our office at [phone]. Be sure the form has your name and date of birth on it.  The form will be faxed to our Forms Department and they will complete it for you.  There is a 25$ fee for all forms that need to be filled out.  Please be aware there is a 10-14 day turnaround time.  You will need to sign a release of information (STAN) form if your paperwork does not come with one.  You may call the Forms Department with any questions at [phone].  Their fax number is [phone].

## 2024-06-16 DIAGNOSIS — Z86.73 HISTORY OF STROKE: ICD-10-CM

## 2024-06-17 RX ORDER — ATORVASTATIN CALCIUM 20 MG/1
20 TABLET, FILM COATED ORAL DAILY
Qty: 90 TABLET | Refills: 1 | Status: SHIPPED | OUTPATIENT
Start: 2024-06-17

## 2024-06-17 NOTE — TELEPHONE ENCOUNTER
Medication: atorvastatin 20 mg     Date of last refill: 12/14/2023 (#90/0)  [place]     Last office visit: 05/17/2024  Due back to clinic per last office note:  10 months  Date next office visit scheduled:    Future Appointments   Date Time Provider Department Center   8/23/2024  9:20 AM Paramjit Mendes DO [id number]           Last OV note recommendation:    Assessment:  This is a 57 y/o male with h/o HTN, HL, CAD and stroke and seizures     Plan:  1. possible seizure - night time events only  - Continue Keppra 500mg PO BID        2. History of stroke  - Continue warfarin   - Continue to follow with cardiology     RTC in 10 months

## 2024-08-23 ENCOUNTER — OFFICE VISIT (OUTPATIENT)
Dept: NEUROLOGY | Facility: CLINIC | Age: 59
End: 2024-08-23
Payer: COMMERCIAL

## 2024-08-23 VITALS
HEART RATE: 71 BPM | OXYGEN SATURATION: 97 % | BODY MASS INDEX: 30 KG/M2 | DIASTOLIC BLOOD PRESSURE: 70 MMHG | SYSTOLIC BLOOD PRESSURE: 120 MMHG | WEIGHT: 172 LBS

## 2024-08-23 DIAGNOSIS — R56.9 SEIZURE (HCC): Primary | ICD-10-CM

## 2024-08-23 PROCEDURE — 3074F SYST BP LT 130 MM HG: CPT | Performed by: OTHER

## 2024-08-23 PROCEDURE — 99213 OFFICE O/P EST LOW 20 MIN: CPT | Performed by: OTHER

## 2024-08-23 PROCEDURE — 3078F DIAST BP <80 MM HG: CPT | Performed by: OTHER

## 2024-08-23 RX ORDER — ATORVASTATIN CALCIUM 20 MG/1
20 TABLET, FILM COATED ORAL NIGHTLY
COMMUNITY

## 2024-08-23 NOTE — PROGRESS NOTES
Neurology H&P    Jose Christina Patient Status:  No patient class for patient encounter    1965 MRN [id number]   Location [facility], [facility] Attending No att. providers found   [facility] Day #  PCP Weston Veras MD     Subjective:  Initial Clinic visit 3/29/17  Jose Christina is a(n) 59 year old male with a PMH significant for HL, HTN, Stroke, CAD and on Warfarin. He comes to neurology clinic for evaluation of possible seizure. He state that per his wife for over 1 year he has been having shaking spells at night. He is not awake for these events. They occur about every 3 months. He is uncertain of how long these last but possibly for a few minutes. He also tends to bite his tongue during these events. He denies any confusion following these events. He states that his wife can wake him up during these events. He has not had any incontinence. He denies any family or personal history of seizure. He denies any h/o CNS infection or TBI. He has never had any febrile seizures as a child. He has never been on any AED. He states that he feels well following these events and has no lethargy, weakness, numbness, tingling or headaches following these events. He thinks that these events have only occurred about 4 times. He denies any episodes of LOC or AMS or lost periods of time.  He is driving. 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 He denies any vision changes or slurred speech. He denies any SOB or difficulty swallowing. He has not had any new stroke like symptoms.       Interim History:  Pt was last seen in clinic on 2022.  He has been doping well since he saw me last. He has not had any seizure. No AMS or LOC or shaking spells.    Current Medications:  Current Outpatient Medications   Medication Sig Dispense Refill    atorvastatin 20 MG Oral Tab Take 1 tablet (20 mg total) by mouth nightly.      metoprolol succinate ER 50 MG Oral Tablet 24 Hr       levETIRAcetam  500 MG Oral Tab Take 1 tablet (500 mg total) by mouth 2 (two) times daily. 180 tablet 3    lisinopril 10 MG Oral Tab Take 1 tablet (10 mg total) by mouth daily. 90 tablet 3    WARFARIN 5 MG Oral Tab TAKE ONE AND ONE-HALF TABLETS 1-DAY WEEKLY AND TAKE ONE TABLET 6-DAYS WEEKLY OR AS DIRECTED 120 tablet 4    aspirin 81 MG Oral Tab EC Take 1 tablet (81 mg total) by mouth daily.         Problem List:  Patient Active Problem List   Diagnosis    Current use of anticoagulants    Cerebral infarction ([facility])    Hyperlipidemia    Hypertension    Valvular heart disease    Ventricular tachycardia (HCC)    CAD (coronary artery disease), native coronary artery    Heart valve replaced by other means    Bicuspid aortic valve ([facility])    S/P aortic valve replacement with metallic valve    RBBB (right bundle branch block with left anterior fascicular block)    History of stroke    Seizure ([facility])    H/O coronary artery bypass surgery       PMHx:  Past Medical History:    Bicuspid aortic valve ([facility])    CAD (coronary artery disease), native coronary artery    Coronary heart disease    Current use of anticoagulants    CVA (cerebral infarction)    May 2011 [facility]     Heart valve replaced by other means    Hyperlipidemia    Hypertension    Valvular heart disease    Ventricular tachycardia (HCC)       PSHx:  Past Surgical History:   Procedure Laterality Date    Cabg  9/26/11    x4 LIMA to LAD, SVG to RCA, diagonal obtuse marginal     Other surgical history      aortic valve replacement #27 [facility] [place] mechanical, [facility]    Other surgical history  11/13/12    laproscopic rt shoulder repair     Replacement prosthetic aortic valve w/ cardiopulmonary bypass; w/ stent< tissue valve  2011    [facility]    Tonsillectomy      age 3       SocHx:  Social History     Socioeconomic History    Marital status:     Number of children: 2   Occupational History     Comment: [employer]   Tobacco Use     Smoking status: Former    Smokeless tobacco: Never    Tobacco comments:     quit 2000   Vaping Use    Vaping status: Never Used   Substance and Sexual Activity    Alcohol use: Yes     Alcohol/week: 14.0 standard drinks of alcohol     Types: 14 Standard drinks or equivalent per week     Comment: 2 drinks a night    Drug use: No   Other Topics Concern    Caffeine Concern Yes     Comment: 1 cup coffee daily    Exercise Yes     Comment: gym every other day     Seat Belt Yes       Family History:  Family History   Problem Relation Age of Onset    Other (Liver cancer) Father     Other (CAD) Father     High Blood Pressure Mother     Stroke Mother         multiple        ROS:  Gen: no unexplained weight loss  Vision: no vision changes, no new blurry or double vision  Head and Neck: no eye or ear discharge  Pulmonary: no SOB, no new cough  CV: no chest pain, no new lower extremity swelling  GI: no constipation or abdominal pain  : denies frequent urination or difficulty urinating   Heme: no new bruising, no unexplained fevers or chills  Endocrine: no unexplained weight loss or gain, no new fatigue  Musculoskeletal: denies back pain, denies joint pain  Psych: denies depression   Skin: denies any new masses, rashes, lumps or bruising    Objective/Physical Exam:    Vital Signs:  Blood pressure 120/70, pulse 71, weight 172 lb (78 kg), SpO2 97%.    Gen: Awake and in no apparent distress  HEENT: moist mucus membranes  Neck: Supple  Cardiovascular: Regular rate and rhythm, mechanical sound  Pulm: CTAB  GI: non-tender, normal bowel sounds  Skin: normal, dry  Extremities: No clubbing or cyanosis      Neurologic:   MENTAL STATUS: alert, ox3, normal attention, language and fund of knowledge.      CRANIAL NERVES II to XII: PERRLA, no ptosis or diplopia, EOM intact, facial sensation intact, strong eye closure and lower facial muscles & jaw muscles; Mild L NLF flattening, no dysarthria, , strong shoulder shrug.    MOTOR EXAMINATION:  normal tone, no fasciculations, normal strength throughout in UEs and LEs     SENSORY EXAMINATION:  UE: intact to light touch, pinprick  LE: intact to light touch, pinprick    COORDINATION:  No dysmetria, mild intention tremors L>R    REFLEXES: 2+ at biceps, 2+ triceps, 2+ at patella     GAIT: normal stance, normal toe gait and heel gait, tandem well.    Romberg's: negative    Labs:       Imaging:  No recent imaging    Assessment:  This is a 58 y/o male with h/o HTN, HL, CAD and stroke and seizures    Plan:  1. possible seizure - night time events only  - Continue Keppra 500mg PO BID       2. History of stroke  - Continue warfarin   - Continue to follow with cardiology    RTC in 12 months    Paramjit Mendes DO  Neurology

## 2024-08-23 NOTE — PATIENT INSTRUCTIONS
Refill policies:    Allow 2-3 business days for refills; controlled substances may take longer.  Contact your pharmacy at least 5 days prior to running out of medication and have them send an electronic request or submit request through the “request refill” option in your [employer] account.  Refills are not addressed on weekends; covering physicians do not authorize routine medications on weekends.  No narcotics or controlled substances are refilled after noon on Fridays or by on call physicians.  By law, narcotics must be electronically prescribed.  A 30 day supply with no refills is the maximum allowed.  If your prescription is due for a refill, you may be due for a follow up appointment.  To best provide you care, patients receiving routine medications need to be seen at least once a year.  Patients receiving narcotic/controlled substance medications need to be seen at least once every 3 months.  In the event that your preferred pharmacy does not have the requested medication in stock (e.g. Backordered), it is your responsibility to find another pharmacy that has the requested medication available.  We will gladly send a new prescription to that pharmacy at your request.    Scheduling Tests:    If your physician has ordered radiology tests such as MRI or CT scans, please contact Central Scheduling at [phone] right away to schedule the test.  Once scheduled, the [facility] Centralized Referral Team will work with your insurance carrier to obtain pre-certification or prior authorization.  Depending on your insurance carrier, approval may take 3-10 days.  It is highly recommended patients assure they have received an authorization before having a test performed.  If test is done without insurance authorization, patient may be responsible for the entire amount billed.      Precertification and Prior Authorizations:  If your physician has recommended that you have a procedure or additional testing performed the [facility]  Centralized Referral Team will contact your insurance carrier to obtain pre-certification or prior authorization.    You are strongly encouraged to contact your insurance carrier to verify that your procedure/test has been approved and is a COVERED benefit.  Although the [facility] Centralized Referral Team does its due diligence, the insurance carrier gives the disclaimer that \"Although the procedure is authorized, this does not guarantee payment.\"    Ultimately the patient is responsible for payment.   Thank you for your understanding in this matter.  Paperwork Completion:  If you require FMLA or disability paperwork for your recovery, please make sure to either drop it off or have it faxed to our office at [phone]. Be sure the form has your name and date of birth on it.  The form will be faxed to our Forms Department and they will complete it for you.  There is a 25$ fee for all forms that need to be filled out.  Please be aware there is a 10-14 day turnaround time.  You will need to sign a release of information (STAN) form if your paperwork does not come with one.  You may call the Forms Department with any questions at [phone].  Their fax number is [phone].

## 2024-10-27 DIAGNOSIS — Z86.73 PERSONAL HISTORY OF TRANSIENT ISCHEMIC ATTACK (TIA), AND CEREBRAL INFARCTION WITHOUT RESIDUAL DEFICITS: ICD-10-CM

## 2024-10-28 NOTE — TELEPHONE ENCOUNTER
Medication: ATORVASTATIN 20 MG Oral Tab      Date of last refill: 06/17/2024 (#90/1)-discon  [place]     Last office visit: 08/23/2024  Due back to clinic per last office note:  12 months  Date next office visit scheduled:    No future appointments.        Last OV note recommendation:    Assessment:  This is a 58 y/o male with h/o HTN, HL, CAD and stroke and seizures     Plan:  1. possible seizure - night time events only  - Continue Keppra 500mg PO BID        2. History of stroke  - Continue warfarin   - Continue to follow with cardiology     RTC in 12 months     Paramjit Mendes,   Neurology

## 2024-10-29 RX ORDER — ATORVASTATIN CALCIUM 20 MG/1
20 TABLET, FILM COATED ORAL DAILY
Qty: 90 TABLET | Refills: 1 | Status: SHIPPED | OUTPATIENT
Start: 2024-10-29

## 2025-05-28 DIAGNOSIS — R56.9 SEIZURE (HCC): ICD-10-CM

## 2025-05-28 RX ORDER — LEVETIRACETAM 500 MG/1
500 TABLET ORAL 2 TIMES DAILY
Qty: 180 TABLET | Refills: 0 | Status: SHIPPED | OUTPATIENT
Start: 2025-05-28

## 2025-05-28 NOTE — TELEPHONE ENCOUNTER
Sent the patient a [employer] message to make an appointment for further medication refills.       Medication: LEVETIRACETAM 500 MG Oral Tab      Date of last refill: 05/17/2024 (#180/3)  [place]     Last office visit: 08/23/2024  Due back to clinic per last office note:  RTC 12 months  Date next office visit scheduled:    No future appointments.        Last OV note recommendation:    Assessment:  This is a 58 y/o male with h/o HTN, HL, CAD and stroke and seizures     Plan:  1. possible seizure - night time events only  - Continue Keppra 500mg PO BID        2. History of stroke  - Continue warfarin   - Continue to follow with cardiology     RTC in 12 months     Paramjit Mendes DO  Neurology

## 2025-06-12 ENCOUNTER — OFFICE VISIT (OUTPATIENT)
Dept: FAMILY MEDICINE CLINIC | Facility: CLINIC | Age: 60
End: 2025-06-12
Payer: COMMERCIAL

## 2025-06-12 VITALS
RESPIRATION RATE: 16 BRPM | DIASTOLIC BLOOD PRESSURE: 68 MMHG | WEIGHT: 162.25 LBS | BODY MASS INDEX: 28 KG/M2 | OXYGEN SATURATION: 96 % | HEART RATE: 60 BPM | SYSTOLIC BLOOD PRESSURE: 120 MMHG

## 2025-06-12 DIAGNOSIS — N52.9 ERECTILE DYSFUNCTION, UNSPECIFIED ERECTILE DYSFUNCTION TYPE: Primary | ICD-10-CM

## 2025-06-12 PROCEDURE — 99213 OFFICE O/P EST LOW 20 MIN: CPT | Performed by: FAMILY MEDICINE

## 2025-06-12 PROCEDURE — 3078F DIAST BP <80 MM HG: CPT | Performed by: FAMILY MEDICINE

## 2025-06-12 PROCEDURE — 3074F SYST BP LT 130 MM HG: CPT | Performed by: FAMILY MEDICINE

## 2025-06-12 RX ORDER — SILDENAFIL 100 MG/1
100 TABLET, FILM COATED ORAL
Qty: 30 TABLET | Refills: 1 | Status: SHIPPED | OUTPATIENT
Start: 2025-06-12

## 2025-06-12 NOTE — PROGRESS NOTES
Chief Complaint   Patient presents with    Erectile Dysfuntion     Patient here for discuss treatment for ED      HPI:   Jose Christina is a 59 year old male who presents to the office for medication recheck.   He is a re-estabishing patient, as LOV was 2019.      in 2018.  Not dating until a few months ago.  Having some ED.  He     Heart - CAD, HTN, HLD, stroke.      Neuro - seizure - shaking spells at night.  On Keppra, managed by neuro.  EEG 2017 abnormal.        REVIEW OF SYSTEMS:   Pertinent items are noted in HPI.  EXAM:   /68   Pulse 60   Resp 16   Wt 162 lb 4 oz (73.6 kg)   SpO2 96%   BMI 27.85 kg/m²     General appearance - alert, well appearing, and in no distress  Chest - clear to auscultation, no wheezes, rales or rhonchi, symmetric air entry  Heart - normal rate, regular rhythm, normal S1, S2, no murmurs, rubs, clicks or gallops  ASSESSMENT AND PLAN:     Jose Christina was seen in the office today:  had concerns including Erectile Dysfuntion (Patient here for discuss treatment for ED).    1. Erectile dysfunction, unspecified erectile dysfunction type  New issue with erectile dysfunction  Able to get and maintain erections under normal circumstances, but when with his female partner harder time maintaining this  Did talk with cardiology team and they recommended he consider Viagra and similar medicines, but to talk to PCP about managing this  Works by increasing blood flow (dilating the blood vessels).  Main side effects are headache, flushing.  Some people get nausea and lightheaded.    Erections lasting 4 hours are a rare but very serious side effect, go to ER immediately.   If you were to have a heart attack after taking the med, important to tell paramedics or ER you took the medicine.  Do not mix this with other heart pills unless asking me or another doctor.   - Sildenafil Citrate 100 MG Oral Tab; Take 1 tablet (100 mg total) by mouth daily as needed for Erectile  Dysfunction.  Dispense: 30 tablet; Refill: 1        Weston Veras M.D.   EMG 3  06/12/25          Note to patient: The 21 Century Cures Act makes medical notes like these available to patients in the interest of transparency. However, be advised this is a medical document. It is intended as peer to peer communication. It is written in medical language and may contain abbreviations or verbiage that are unfamiliar. It may appear blunt or direct. Medical documents are intended to carry relevant information, facts as evident, and the clinical opinion of the practitioner.

## 2025-06-24 ENCOUNTER — PATIENT OUTREACH (OUTPATIENT)
Dept: FAMILY MEDICINE CLINIC | Facility: CLINIC | Age: 60
End: 2025-06-24

## (undated) NOTE — MR AVS SNAPSHOT
[facility], [facility], [address] . [address] [address] [phone]               Thank you for choosing us for your health care visit with Brannon Torres DO.   We are glad to serve you and happy to provide you with [facility] ? No narcotics or controlled substances are refilled after noon on Fridays or by on call physicians. ? By law, narcotics cannot be faxed or phoned into your pharmacy.  The prescription must be signed by the provider, picked up in our office and physically diligence, the insurance carrier gives the disclaimer that \"Although the procedure is authorized, this does not guarantee payment. \"    Ultimately the patient is responsible for payment. Thank you for your understanding in the matter.            Claudette discharge instructions in [employer] by going to Visits < Admission Summaries. If you've been to the Emergency Department or your doctor's office, you can view your past visit information in [employer] by going to Visits < Visit Summaries. [employer] questions?

## (undated) NOTE — MR AVS SNAPSHOT
[address] [facility]  [address] [place] [address], [address] [address] [phone]               Thank you for choosing us for your health care visit with Temitope Rodriguez DO.   We are glad to serve you and happy to provide you with [facility] Imaging:  MRI BRAIN (W+WO) (CPT=70553)    Instructions:  IMPORTANT    Your physician has ordered a radiology test that may require authorization from your insurance company.   Your physician or the clinic staff will work with your insurance company to obt If your physician has ordered radiology tests such as MRI or CT scans, do not schedule the test until this office has notified you that the test has been approved by your insurer. Depending on your insurance carrier, approval may take 3-10 days.  It is hig push and a diary to document any symptoms you may have during your testing. You will not be able to shower with the electrodes on your head. Please try to follow your normal daily routine as much as possible.  During this time you can eat and sleep as you n office, you can view your past visit information in [employer] by going to Visits < Visit Summaries. [employer] questions? Call [phone] for help. [employer] is NOT to be used for urgent needs. For medical emergencies, dial 911.            Visit [facility]

## (undated) NOTE — MR AVS SNAPSHOT
[address] [facility] [address]  [facility],  [address]  [address] [place] [address] [phone]               Thank you for choosing us for your health care visit with Ty Alvarenga MD.  We are glad to serve you and happy to provide you with this s your appointment immediately. However, if you are unsure about the requirements for authorization, please wait 5-7 days and then contact your physician's office.  At that time, you will be provided with any authorization numbers or be assured that none are [email or web address]. If you've recently had a stay at the Hospital you can access your discharge instructions in [employer] by going to Visits < Admission Summaries.  If you've been to the Emergency Department or your doctor's office, you can view yo

## (undated) NOTE — LETTER
Date: 2/28/2019    Patient Name: Caity Etienne          To Whom it may concern:    Patient does not have significant scoliosis. He has never had any back issues and no limitations with movement. He does not require any work restrictions.         [employer]

## (undated) NOTE — MR AVS SNAPSHOT
[facility], [address], [address] [place] [id number] [phone]               Thank you for choosing us for your health care visit with GRACIELA Pritchard.   We are glad to serve you and happy to provide you with [facility] Metoprolol Succinate  MG Tb24   Take 1 tablet (100 mg total) by mouth once daily. Commonly known as: Toprol XL           Warfarin Sodium 5 MG Tabs   Take 1-2 tablets daily as directed by Anticoagulation Clinic.    Commonly known as:  COUMADIN active are less likely to develop some chronic diseases than adults who are inactive.      HOW TO GET STARTED: HOW TO STAY MOTIVATED:   Start activities slowly and build up over time Do what you like   Get your heart pumping – brisk walking, biking, swimmin